# Patient Record
Sex: FEMALE | Race: WHITE | NOT HISPANIC OR LATINO | Employment: UNEMPLOYED | URBAN - METROPOLITAN AREA
[De-identification: names, ages, dates, MRNs, and addresses within clinical notes are randomized per-mention and may not be internally consistent; named-entity substitution may affect disease eponyms.]

---

## 2017-02-13 ENCOUNTER — HOSPITAL ENCOUNTER (EMERGENCY)
Facility: HOSPITAL | Age: 42
Discharge: HOME | End: 2017-02-13
Attending: EMERGENCY MEDICINE
Payer: MEDICAID

## 2017-02-13 VITALS
OXYGEN SATURATION: 97 % | SYSTOLIC BLOOD PRESSURE: 120 MMHG | DIASTOLIC BLOOD PRESSURE: 78 MMHG | HEIGHT: 63 IN | TEMPERATURE: 98 F | RESPIRATION RATE: 20 BRPM | WEIGHT: 135 LBS | HEART RATE: 80 BPM | BODY MASS INDEX: 23.92 KG/M2

## 2017-02-13 DIAGNOSIS — R07.9 CHEST PAIN: ICD-10-CM

## 2017-02-13 DIAGNOSIS — M94.0 COSTOCHONDRITIS: Primary | ICD-10-CM

## 2017-02-13 LAB
ALBUMIN SERPL BCP-MCNC: 4.4 G/DL
ALP SERPL-CCNC: 100 U/L
ALT SERPL W/O P-5'-P-CCNC: 16 U/L
ANION GAP SERPL CALC-SCNC: 9 MMOL/L
AST SERPL-CCNC: 17 U/L
BASOPHILS # BLD AUTO: 0.05 K/UL
BASOPHILS NFR BLD: 0.7 %
BILIRUB SERPL-MCNC: 0.3 MG/DL
BILIRUB UR QL STRIP: NEGATIVE
BUN SERPL-MCNC: 32 MG/DL
CALCIUM SERPL-MCNC: 10.1 MG/DL
CHLORIDE SERPL-SCNC: 103 MMOL/L
CLARITY UR: CLEAR
CO2 SERPL-SCNC: 26 MMOL/L
COLOR UR: NORMAL
CREAT SERPL-MCNC: 1 MG/DL
DIFFERENTIAL METHOD: ABNORMAL
EOSINOPHIL # BLD AUTO: 0.2 K/UL
EOSINOPHIL NFR BLD: 2.2 %
ERYTHROCYTE [DISTWIDTH] IN BLOOD BY AUTOMATED COUNT: 13.5 %
EST. GFR  (AFRICAN AMERICAN): >60 ML/MIN/1.73 M^2
EST. GFR  (NON AFRICAN AMERICAN): >60 ML/MIN/1.73 M^2
GLUCOSE SERPL-MCNC: 88 MG/DL
GLUCOSE UR QL STRIP: NEGATIVE
HCT VFR BLD AUTO: 45.3 %
HGB BLD-MCNC: 15.2 G/DL
HGB UR QL STRIP: NEGATIVE
KETONES UR QL STRIP: NEGATIVE
LEUKOCYTE ESTERASE UR QL STRIP: NEGATIVE
LIPASE SERPL-CCNC: 46 U/L
LYMPHOCYTES # BLD AUTO: 2.8 K/UL
LYMPHOCYTES NFR BLD: 37.7 %
MCH RBC QN AUTO: 30.8 PG
MCHC RBC AUTO-ENTMCNC: 33.6 %
MCV RBC AUTO: 92 FL
MONOCYTES # BLD AUTO: 0.3 K/UL
MONOCYTES NFR BLD: 4.1 %
NEUTROPHILS # BLD AUTO: 4 K/UL
NEUTROPHILS NFR BLD: 55.3 %
NITRITE UR QL STRIP: NEGATIVE
PH UR STRIP: 5 [PH] (ref 5–8)
PLATELET # BLD AUTO: 352 K/UL
PMV BLD AUTO: 11.6 FL
POTASSIUM SERPL-SCNC: 4.9 MMOL/L
PROT SERPL-MCNC: 8.4 G/DL
PROT UR QL STRIP: NEGATIVE
RBC # BLD AUTO: 4.94 M/UL
SODIUM SERPL-SCNC: 138 MMOL/L
SP GR UR STRIP: 1.02 (ref 1–1.03)
TROPONIN I SERPL DL<=0.01 NG/ML-MCNC: 0.01 NG/ML
URN SPEC COLLECT METH UR: NORMAL
UROBILINOGEN UR STRIP-ACNC: NEGATIVE EU/DL
WBC # BLD AUTO: 7.3 K/UL

## 2017-02-13 PROCEDURE — 80053 COMPREHEN METABOLIC PANEL: CPT

## 2017-02-13 PROCEDURE — 85025 COMPLETE CBC W/AUTO DIFF WBC: CPT

## 2017-02-13 PROCEDURE — 25000003 PHARM REV CODE 250: Performed by: EMERGENCY MEDICINE

## 2017-02-13 PROCEDURE — 84484 ASSAY OF TROPONIN QUANT: CPT

## 2017-02-13 PROCEDURE — 96372 THER/PROPH/DIAG INJ SC/IM: CPT

## 2017-02-13 PROCEDURE — 83690 ASSAY OF LIPASE: CPT

## 2017-02-13 PROCEDURE — 63600175 PHARM REV CODE 636 W HCPCS: Performed by: EMERGENCY MEDICINE

## 2017-02-13 PROCEDURE — 81003 URINALYSIS AUTO W/O SCOPE: CPT

## 2017-02-13 PROCEDURE — 99284 EMERGENCY DEPT VISIT MOD MDM: CPT | Mod: 25

## 2017-02-13 RX ORDER — NAPROXEN 375 MG/1
375 TABLET ORAL 2 TIMES DAILY WITH MEALS
Qty: 30 TABLET | Refills: 0 | Status: SHIPPED | OUTPATIENT
Start: 2017-02-13 | End: 2017-06-13

## 2017-02-13 RX ORDER — CYCLOBENZAPRINE HCL 10 MG
10 TABLET ORAL 3 TIMES DAILY PRN
Qty: 15 TABLET | Refills: 0 | Status: SHIPPED | OUTPATIENT
Start: 2017-02-13 | End: 2017-02-18

## 2017-02-13 RX ORDER — KETOROLAC TROMETHAMINE 30 MG/ML
10 INJECTION, SOLUTION INTRAMUSCULAR; INTRAVENOUS
Status: COMPLETED | OUTPATIENT
Start: 2017-02-13 | End: 2017-02-13

## 2017-02-13 RX ADMIN — KETOROLAC TROMETHAMINE 10 MG: 30 INJECTION, SOLUTION INTRAMUSCULAR at 10:02

## 2017-02-13 RX ADMIN — LIDOCAINE HYDROCHLORIDE: 20 SOLUTION ORAL; TOPICAL at 08:02

## 2017-02-13 NOTE — ED TRIAGE NOTES
C/o chest pain x 2 weeks. Radiating from rt side under breast to lt side chest area. C/o gas and heartburn. Had been taking tums which relieved Sx. Gas x is ineffective. C/o vomiting. Last episode was yesterday. Reports diarrhea last episode 2 days ago. denies fever or abd pain.

## 2017-02-13 NOTE — ED AVS SNAPSHOT
[facility]  [address] [place] [address]               Miriam Reyes   2017  7:44 AM   ED    Description:  Female : 1975   Department:  [facility]           Your Care was Coordinated By:     Provider Role From To    Mary Land MD Attending Provider 17 [id number] --      Reason for Visit     Chest Pain           Diagnoses this Visit        Comments    Costochondritis    -  Primary     Chest pain           ED Disposition     ED Disposition Condition Comment    Discharge             To Do List           Follow-up Information     Follow up with Reji Mtz NP. Schedule an appointment as soon as possible for a visit in 2 days.    Specialty:  Internal Medicine    Contact information:    [address] [place] [address] [phone]         These Medications        Disp Refills Start End    cyclobenzaprine (FLEXERIL) 10 MG tablet 15 tablet 0 2017    Take 1 tablet (10 mg total) by mouth 3 (three) times daily as needed for Muscle spasms. - Oral    Pharmacy: [place] [id number] Ph #: [phone]       naproxen (NAPROSYN) 375 MG tablet 30 tablet 0 2017     Take 1 tablet (375 mg total) by mouth 2 (two) times daily with meals. - Oral    Pharmacy: [place] [id number] Ph #: [phone]         [facility]     [facility] -  Assistance  Registered nurses in the [facility] provide clinical advisement, health education, appointment booking, and other advisory services.  Call for this free service at [phone].             Medications           Message regarding Medications     Verify the changes and/or additions to your medication regime listed below are the same as discussed with your clinician today.  If any of these changes or additions are incorrect, please notify your healthcare provider.         START taking these NEW medications        Refills    cyclobenzaprine (FLEXERIL) 10 MG tablet 0    Sig: Take 1 tablet (10 mg total) by mouth 3 (three) times daily as needed for Muscle spasms.    Class: Print    Route: Oral    naproxen (NAPROSYN) 375 MG tablet 0    Sig: Take 1 tablet (375 mg total) by mouth 2 (two) times daily with meals.    Class: Print    Route: Oral      These medications were administered today        Dose Freq    (pyxis) gi cocktail (mylanta 30 mL, lidocaine 2 % viscous 10 mL, dicyclomine 10 mL) 50 mL  ED 1 Time    Sig: Take by mouth ED 1 Time.    Class: Normal    Route: Oral    ketorolac injection 10 mg 10 mg ED 1 Time    Sig: Inject 10 mg into the muscle ED 1 Time.    Class: Normal    Route: Intramuscular           Verify that the below list of medications is an accurate representation of the medications you are currently taking.  If none reported, the list may be blank. If incorrect, please contact your healthcare provider. Carry this list with you in case of emergency.           Current Medications     clonazepam (KLONOPIN) 0.5 MG tablet Take 0.5 mg by mouth 2 (two) times daily.    fluoxetine (PROZAC) 20 MG capsule Take 20 mg by mouth once daily.    risperidone (RISPERDAL) 1 MG tablet Take 1 mg by mouth every evening.    trazodone (DESYREL) 50 MG tablet Take 50 mg by mouth every evening.    (Magic mouthwash) 1:1:1 Benadryl 12.5mg/5ml liq, aluminum & magnesium hydroxide-simehticone (Maalox), lidocaine viscous 2% Swish and swallow 90 mLs every 4 (four) hours as needed. for mouth sores    acetic ac-apy-benzo-policos-al (AURALGAN W/ ACETIC ACID) 5.4-1.4 % otic solution Place 4 drops in ear(s) 3 (three) times daily.    cyclobenzaprine (FLEXERIL) 10 MG tablet Take 1 tablet (10 mg total) by mouth 3 (three) times daily as needed for Muscle spasms.    estradiol (EVAMIST) 1.53 mg/spray (1.7%) transdermal spray Place 3 sprays onto the skin once daily.    hydrocodone-acetaminophen 5-325mg (NORCO)  "5-325 mg per tablet Take 1 tablet by mouth every 4 (four) hours as needed for Pain.    ketorolac injection 10 mg Inject 10 mg into the muscle ED 1 Time.    methylPREDNISolone (MEDROL DOSEPACK) 4 mg tablet use as directed    naproxen (NAPROSYN) 375 MG tablet Take 1 tablet (375 mg total) by mouth 2 (two) times daily with meals.    pantoprazole (PROTONIX) 20 MG tablet Take 2 tablets (40 mg total) by mouth once daily.           Clinical Reference Information           Your Vitals Were     BP Pulse Temp Resp Height Weight    119/82 (BP Location: Left arm) 82 97.9 °F (36.6 °C) (Oral) 18 5' 3" (1.6 m) 61.2 kg (135 lb)    SpO2 BMI             97% 23.91 kg/m2         Allergies as of 2/13/2017        Reactions    Metaxalone     Other reaction(s): Anaphylaxis    Soma [Carisoprodol]     Other reaction(s): Anaphylaxis    Penicillins     Other reaction(s): Unknown      Immunizations Administered on Date of Encounter - 2/13/2017     None      ED Micro, Lab, POCT     Start Ordered       Status Ordering Provider    02/13/17 0806 02/13/17 0806  CBC auto differential  STAT      Final result     02/13/17 0806 02/13/17 0806  Comprehensive metabolic panel  STAT      Final result     02/13/17 0806 02/13/17 0806  Lipase  STAT      Final result     02/13/17 0806 02/13/17 0806  Troponin I  STAT      Final result     02/13/17 0806 02/13/17 0806  Urinalysis  STAT      Final result       ED Imaging Orders     Start Ordered       Status Ordering Provider    02/13/17 0806 02/13/17 0806  X-Ray Chest PA And Lateral  1 time imaging      Final result         Discharge Instructions         Take medications as directed.  Follow-up your primary care doctor.  Return for any new or worsening complaints.  Chest Wall Pain: Costochondritis    The chest pain that you have had today is caused by costochondritis. This condition is caused by an inflammation of the cartilage joining your ribs to your breastbone. It is not caused by heart or lung problems. The " inflammation may have been brought on by a blow to the chest, lifting heavy objects, intense exercise, or an illness that made you cough and sneeze. It often occurs during times of emotional stress. It can be painful, but it is not dangerous. It usually goes away in 1 to 2 weeks. But it may happen again. Rarely, a more serious condition may cause symptoms similar to costochondritis. Thats why its important to watch for the warning signs listed below.  Home care  Follow these guidelines when caring for yourself at home:  · If you feel that emotional stress is a cause of your condition, try to figure out the sources of that stress. It may not be obvious! Learn ways to deal with the stress in your life. This can include regular exercise, muscle relaxation, meditation, or simply taking time out for yourself. For more information about this, talk with your health care provider. Or go to your local library and look at books on stress reduction.  · You may use acetaminophen or ibuprofen to control pain, unless another pain medicine was prescribed. If you have liver disease or ever had a stomach ulcer, talk with your health care provider before using these medicines.  · You can also help ease pain by using a hot, wet compress or heating pad. Use this with or without a medicated skin cream that helps relieves pain.  · Do stretching exercise as advised by your provider.  · Take any prescribed medicines as directed.  Follow-up care  Follow up with your health care provider, or as advised, if you do not start to get better in the next 2 days.  When to seek medical advice  Call your health care provider right away if any of these occur:  · A change in the type of pain. Call if it feels different, becomes more serious, lasts longer, or spreads into your shoulder, arm, neck, jaw, or back.  · Shortness of breath or pain gets worse when you breathe  · Weakness, dizziness, or fainting  · Cough with dark-colored sputum (phlegm) or  blood  · Abdominal pain  · Dark red or black stools  · Fever of 100.4ºF (38ºC) or higher, or as directed by your health care provider  Date Last Reviewed: 11/24/2014  © 2428-5934 [employer]. [address] [place] [address], [place] [address]. All rights reserved. This information is not intended as a substitute for professional medical care. Always follow your healthcare professional's instructions.          [facility] Sign-Up     Activating your [facility] account is as easy as 1-2-3!     1) Visit [email or web address], select Sign Up Now, enter this activation code and your date of birth, then select Next.  [email or web address]  Expires: 3/30/2017  9:47 AM      2) Create a username and password to use when you visit [facility] in the future and select a security question in case you lose your password and select Next.    3) Enter your e-mail address and click Sign Up!    Additional Information  If you have questions, please e-mail [email or web address] or call [phone] to talk to our [facility] staff. Remember, [facility] is NOT to be used for urgent needs. For medical emergencies, dial 911.         Smoking Cessation     If you would like to quit smoking:   You may be eligible for free services if you are a [place] resident and started smoking cigarettes before September 1, 1988.  Call the Smoking Cessation Trust (SCT) toll free at (019) 081-8851 or (061) 195-4710.   Call 1-800-QUIT-NOW if you do not meet the above criteria.             [facility] complies with applicable Federal civil rights laws and does not discriminate on the basis of race, color, national origin, age, disability, or sex.        Language Assistance Services     ATTENTION: Language assistance services are available, free of charge. Please call [phone].      ATENCIÓN: Si habla español, tiene a villela disposición servicios gratuitos de asistencia lingüística. Llame al [phone].     CHÚ Ý: N?u b?n nói Ti?ng Vi?t, có các  d?ch v? h? tr? wilton abez? mi?n phí dành cho b?n. G?i s? [phone].

## 2017-02-13 NOTE — DISCHARGE INSTRUCTIONS
Take medications as directed.  Follow-up your primary care doctor.  Return for any new or worsening complaints.  Chest Wall Pain: Costochondritis    The chest pain that you have had today is caused by costochondritis. This condition is caused by an inflammation of the cartilage joining your ribs to your breastbone. It is not caused by heart or lung problems. The inflammation may have been brought on by a blow to the chest, lifting heavy objects, intense exercise, or an illness that made you cough and sneeze. It often occurs during times of emotional stress. It can be painful, but it is not dangerous. It usually goes away in 1 to 2 weeks. But it may happen again. Rarely, a more serious condition may cause symptoms similar to costochondritis. Thats why its important to watch for the warning signs listed below.  Home care  Follow these guidelines when caring for yourself at home:  · If you feel that emotional stress is a cause of your condition, try to figure out the sources of that stress. It may not be obvious! Learn ways to deal with the stress in your life. This can include regular exercise, muscle relaxation, meditation, or simply taking time out for yourself. For more information about this, talk with your health care provider. Or go to your local library and look at books on stress reduction.  · You may use acetaminophen or ibuprofen to control pain, unless another pain medicine was prescribed. If you have liver disease or ever had a stomach ulcer, talk with your health care provider before using these medicines.  · You can also help ease pain by using a hot, wet compress or heating pad. Use this with or without a medicated skin cream that helps relieves pain.  · Do stretching exercise as advised by your provider.  · Take any prescribed medicines as directed.  Follow-up care  Follow up with your health care provider, or as advised, if you do not start to get better in the next 2 days.  When to seek medical  advice  Call your health care provider right away if any of these occur:  · A change in the type of pain. Call if it feels different, becomes more serious, lasts longer, or spreads into your shoulder, arm, neck, jaw, or back.  · Shortness of breath or pain gets worse when you breathe  · Weakness, dizziness, or fainting  · Cough with dark-colored sputum (phlegm) or blood  · Abdominal pain  · Dark red or black stools  · Fever of 100.4ºF (38ºC) or higher, or as directed by your health care provider  Date Last Reviewed: 11/24/2014  © 9218-9128 [employer]. [address] [place] [address], [place] [address]. All rights reserved. This information is not intended as a substitute for professional medical care. Always follow your healthcare professional's instructions.

## 2017-02-13 NOTE — ED PROVIDER NOTES
"Encounter Date: 2/13/2017    SCRIBE #1 NOTE: I, Terri Samano, am scribing for, and in the presence of,  Mary Land MD. I have scribed the following portions of the note - Other sections scribed: ROS, HPI.       History     Chief Complaint   Patient presents with    Chest Pain     Pt states she reached over into the bed of a truck and hit her rib cage on right side and now she states it radiates to her left side.  Also states she has gas.       Review of patient's allergies indicates:   Allergen Reactions    Metaxalone      Other reaction(s): Anaphylaxis    Soma [carisoprodol]      Other reaction(s): Anaphylaxis    Penicillins      Other reaction(s): Unknown     HPI Comments: CC: Chest Pain    HPI: Patient is a 41 y.o. F with a past medical history of Anxiety; Bipolar 1 disorder; Chronic kidney disease; Endometriosis of pelvis; and Ovarian cancer who presents to the ED for evaluation of acute, worsening chest pain that "started on the right side below her rib cage but has spread to the left chest and under the left underarm" x2 weeks. She reports associated nausea, vomiting (last episode this morning), and a subjective fever.  Pain is severe, worsening, and exacerbated by movement or twisting. She attempted treatment with Ibuprofen (last dose 2 days ago) with mild relief of R-sided chest pain. Patient states she "had been eating a lot of ramen noodles" and was concerned her symptoms were related to gas; she attempted treatment with Tums and GasX which relieved her gas but not her chest pain. She denies abdominal pain, cough, shortness of breath, BLE swelling, calf pain, rash, dysuria, appetite change, and constipation. Patient has no PMHx of DVT, and reports no recent travel, surgeries, or heavy lifting. PCP is Dr. Steve.  Denies family history of coronary artery disease.    The history is provided by the patient. No  was used.     Past Medical History   Diagnosis Date    Anxiety     " Bipolar 1 disorder     Chronic kidney disease      NEPHROLITHIASIS    Endometriosis of pelvis     H/O colonoscopy 1/10/14     INTERNAL HEMORRHOID    Lactose intolerance     Ovarian cancer      No past medical history pertinent negatives.  Past Surgical History   Procedure Laterality Date     section      Tubal ligation      Hysterectomy      Appendectomy      Cholecystectomy       Family History   Problem Relation Age of Onset    Mental illness Mother     Heart disease Father      defibrillator    Cancer Maternal Grandmother      ovarian cancer     Social History   Substance Use Topics    Smoking status: Former Smoker     Packs/day: 0.50     Years: 22.00     Quit date: 2012    Smokeless tobacco: None    Alcohol use No      Comment: rarely     Review of Systems   Constitutional: Positive for fever (subjective). Negative for appetite change, chills and fatigue.   HENT: Negative for congestion, drooling, hearing loss, rhinorrhea, sneezing, sore throat and voice change.    Eyes: Negative for redness and visual disturbance.   Respiratory: Negative for cough, choking, shortness of breath, wheezing and stridor.    Cardiovascular: Positive for chest pain. Negative for palpitations and leg swelling.   Gastrointestinal: Positive for nausea and vomiting. Negative for abdominal pain, constipation and rectal pain.   Endocrine: Negative for polydipsia.   Genitourinary: Negative for dysuria, flank pain, frequency and hematuria.   Musculoskeletal: Negative for arthralgias, back pain, myalgias and neck pain.   Skin: Negative for rash.   Neurological: Negative for seizures, syncope, weakness, numbness and headaches.   Hematological: Negative for adenopathy.   Psychiatric/Behavioral: Negative for agitation and confusion.       Physical Exam   Initial Vitals   BP Pulse Resp Temp SpO2   17 0733 17 0733 17 0733 17 0733 17 0733   119/82 82 18 97.9 °F (36.6 °C) 97 %     Physical  Exam    Nursing note and vitals reviewed.  Constitutional: She appears well-developed and well-nourished. She is not diaphoretic. No distress.   HENT:   Head: Normocephalic and atraumatic.   Right Ear: External ear normal.   Left Ear: External ear normal.   Nose: Nose normal.   Mouth/Throat: Oropharynx is clear and moist. No oropharyngeal exudate.   Eyes: Conjunctivae and EOM are normal. Pupils are equal, round, and reactive to light. Right eye exhibits no discharge. Left eye exhibits no discharge. No scleral icterus.   Neck: Normal range of motion. Neck supple. No thyromegaly present. No tracheal deviation present.   Cardiovascular: Normal rate, regular rhythm, normal heart sounds and intact distal pulses. Exam reveals no gallop and no friction rub.    No murmur heard.  Pulmonary/Chest: Breath sounds normal. No stridor. No respiratory distress. She has no wheezes. She has no rhonchi. She has no rales. She exhibits no tenderness.   Abdominal: Soft. Bowel sounds are normal. She exhibits no distension. There is no tenderness. There is no rebound.   Musculoskeletal: Normal range of motion. She exhibits no edema or tenderness.   Lymphadenopathy:     She has no cervical adenopathy.   Neurological: She is alert and oriented to person, place, and time. She has normal strength and normal reflexes. No cranial nerve deficit or sensory deficit.   Skin: Skin is warm and dry. No rash noted. No erythema. No pallor.   Psychiatric: She has a normal mood and affect. Her behavior is normal. Thought content normal.         ED Course   Procedures  Labs Reviewed   CBC W/ AUTO DIFFERENTIAL - Abnormal; Notable for the following:        Result Value    Platelets 352 (*)     All other components within normal limits   COMPREHENSIVE METABOLIC PANEL - Abnormal; Notable for the following:     BUN, Bld 32 (*)     All other components within normal limits   LIPASE   TROPONIN I   URINALYSIS     EKG Readings: (Independently Interpreted)   Initial  Reading: No STEMI. Rhythm: Normal Sinus Rhythm. Heart Rate: 77. Ectopy: No Ectopy. ST Segments: Normal ST Segments. T Waves: Normal. Axis: Normal.       X-Rays:   Independently Interpreted Readings:   Other Readings:  Chest x-ray was reviewed.  There is no focal consolidation or pneumothorax.  No pleural effusion.    Medical Decision Makin-year-old female with history of bipolar disorder, endometriosis, ovarian cancer presents to the emergency department complaining of atraumatic chest wall pain that began on the right side and moved to the left side of her chest for the last 2 weeks.  Patient also reports GERD-like symptoms over the same time period that improved with Tums and Avelina-Delmar.  On exam she is afebrile with stable vital signs.  She is nontoxic appearing.  She is in no respiratory distress.  Differential diagnosis includes but is not limited to costochondritis, esophageal spasm, peptic ulcer disease, GERD, viral syndrome, muscle strain.  I considered but do not suspect sepsis, aortic dissection, acute coronary syndrome, pulmonary embolism.  Labs were reviewed and are grossly unremarkable.  Patient feels better after GI cocktail and Toradol.  X-ray is unremarkable.  EKG is unremarkable.  I have counseled the patient on the importance of close follow-up with her primary care doctor.  Return precautions were given.  She agrees with this plan.            Scribe Attestation:   Scribe #1: I performed the above scribed service and the documentation accurately describes the services I performed. I attest to the accuracy of the note.    Attending Attestation:           Physician Attestation for Scribe:  Physician Attestation Statement for Scribe #1: I, Mary Land MD, reviewed documentation, as scribed by Terri Samano in my presence, and it is both accurate and complete.                 ED Course     Clinical Impression:   The primary encounter diagnosis was Costochondritis. A diagnosis of Chest  pain was also pertinent to this visit.          Mary Land MD  02/13/17 9403

## 2017-06-13 ENCOUNTER — HOSPITAL ENCOUNTER (EMERGENCY)
Facility: HOSPITAL | Age: 42
Discharge: HOME | End: 2017-06-13
Attending: EMERGENCY MEDICINE
Payer: MEDICAID

## 2017-06-13 VITALS
DIASTOLIC BLOOD PRESSURE: 84 MMHG | SYSTOLIC BLOOD PRESSURE: 124 MMHG | WEIGHT: 152 LBS | HEART RATE: 78 BPM | BODY MASS INDEX: 26.93 KG/M2 | HEIGHT: 63 IN | OXYGEN SATURATION: 99 % | RESPIRATION RATE: 18 BRPM | TEMPERATURE: 99 F

## 2017-06-13 DIAGNOSIS — M25.561 KNEE PAIN, RIGHT: ICD-10-CM

## 2017-06-13 DIAGNOSIS — M25.579 ANKLE PAIN: ICD-10-CM

## 2017-06-13 DIAGNOSIS — M25.561 RIGHT KNEE PAIN: ICD-10-CM

## 2017-06-13 DIAGNOSIS — S86.911A KNEE STRAIN, RIGHT, INITIAL ENCOUNTER: Primary | ICD-10-CM

## 2017-06-13 LAB
ALBUMIN SERPL BCP-MCNC: 4 G/DL
ALP SERPL-CCNC: 74 U/L
ALT SERPL W/O P-5'-P-CCNC: 15 U/L
ANION GAP SERPL CALC-SCNC: 8 MMOL/L
ANION GAP SERPL CALC-SCNC: 8 MMOL/L
AST SERPL-CCNC: 18 U/L
BASOPHILS # BLD AUTO: 0.03 K/UL
BASOPHILS NFR BLD: 0.5 %
BILIRUB SERPL-MCNC: 0.3 MG/DL
BUN SERPL-MCNC: 19 MG/DL
BUN SERPL-MCNC: 19 MG/DL
CALCIUM SERPL-MCNC: 9.8 MG/DL
CALCIUM SERPL-MCNC: 9.8 MG/DL
CHLORIDE SERPL-SCNC: 101 MMOL/L
CHLORIDE SERPL-SCNC: 101 MMOL/L
CO2 SERPL-SCNC: 26 MMOL/L
CO2 SERPL-SCNC: 26 MMOL/L
CREAT SERPL-MCNC: 0.9 MG/DL
CREAT SERPL-MCNC: 0.9 MG/DL
CRP SERPL-MCNC: 1.2 MG/L
DIFFERENTIAL METHOD: NORMAL
EOSINOPHIL # BLD AUTO: 0.1 K/UL
EOSINOPHIL NFR BLD: 1.2 %
ERYTHROCYTE [DISTWIDTH] IN BLOOD BY AUTOMATED COUNT: 13.3 %
ERYTHROCYTE [SEDIMENTATION RATE] IN BLOOD BY WESTERGREN METHOD: 5 MM/HR
EST. GFR  (AFRICAN AMERICAN): >60 ML/MIN/1.73 M^2
EST. GFR  (AFRICAN AMERICAN): >60 ML/MIN/1.73 M^2
EST. GFR  (NON AFRICAN AMERICAN): >60 ML/MIN/1.73 M^2
EST. GFR  (NON AFRICAN AMERICAN): >60 ML/MIN/1.73 M^2
GLUCOSE SERPL-MCNC: 95 MG/DL
GLUCOSE SERPL-MCNC: 95 MG/DL
HCT VFR BLD AUTO: 44.5 %
HGB BLD-MCNC: 15.1 G/DL
LYMPHOCYTES # BLD AUTO: 1.7 K/UL
LYMPHOCYTES NFR BLD: 25.1 %
MCH RBC QN AUTO: 30.4 PG
MCHC RBC AUTO-ENTMCNC: 33.9 %
MCV RBC AUTO: 90 FL
MONOCYTES # BLD AUTO: 0.5 K/UL
MONOCYTES NFR BLD: 6.8 %
NEUTROPHILS # BLD AUTO: 4.4 K/UL
NEUTROPHILS NFR BLD: 66.4 %
PLATELET # BLD AUTO: 283 K/UL
PMV BLD AUTO: 11.4 FL
POTASSIUM SERPL-SCNC: 4.5 MMOL/L
POTASSIUM SERPL-SCNC: 4.5 MMOL/L
PROT SERPL-MCNC: 7.8 G/DL
RBC # BLD AUTO: 4.97 M/UL
SODIUM SERPL-SCNC: 135 MMOL/L
SODIUM SERPL-SCNC: 135 MMOL/L
URATE SERPL-MCNC: 3.4 MG/DL
WBC # BLD AUTO: 6.66 K/UL

## 2017-06-13 PROCEDURE — 96374 THER/PROPH/DIAG INJ IV PUSH: CPT

## 2017-06-13 PROCEDURE — 99284 EMERGENCY DEPT VISIT MOD MDM: CPT | Mod: 25

## 2017-06-13 PROCEDURE — 63600175 PHARM REV CODE 636 W HCPCS: Performed by: NURSE PRACTITIONER

## 2017-06-13 PROCEDURE — 85025 COMPLETE CBC W/AUTO DIFF WBC: CPT

## 2017-06-13 PROCEDURE — 84550 ASSAY OF BLOOD/URIC ACID: CPT

## 2017-06-13 PROCEDURE — 86140 C-REACTIVE PROTEIN: CPT

## 2017-06-13 PROCEDURE — 85651 RBC SED RATE NONAUTOMATED: CPT

## 2017-06-13 PROCEDURE — 80053 COMPREHEN METABOLIC PANEL: CPT

## 2017-06-13 PROCEDURE — 29505 APPLICATION LONG LEG SPLINT: CPT | Mod: RT

## 2017-06-13 RX ORDER — NAPROXEN 500 MG/1
500 TABLET ORAL 2 TIMES DAILY WITH MEALS
Qty: 60 TABLET | Refills: 0 | Status: SHIPPED | OUTPATIENT
Start: 2017-06-13 | End: 2019-01-04

## 2017-06-13 RX ORDER — IBUPROFEN 200 MG
200 TABLET ORAL EVERY 6 HOURS PRN
COMMUNITY
End: 2019-01-04

## 2017-06-13 RX ORDER — KETOROLAC TROMETHAMINE 30 MG/ML
10 INJECTION, SOLUTION INTRAMUSCULAR; INTRAVENOUS
Status: COMPLETED | OUTPATIENT
Start: 2017-06-13 | End: 2017-06-13

## 2017-06-13 RX ORDER — ACETAMINOPHEN 325 MG/1
325 TABLET ORAL EVERY 6 HOURS PRN
Status: ON HOLD | COMMUNITY
End: 2023-03-17 | Stop reason: HOSPADM

## 2017-06-13 RX ADMIN — KETOROLAC TROMETHAMINE 10 MG: 30 INJECTION, SOLUTION INTRAMUSCULAR at 03:06

## 2017-06-13 NOTE — DISCHARGE INSTRUCTIONS
Try to get plenty of rest and stay well-hydrated on these medications.  You can wear the knee immobilizer to help rest the joint.  Use crutches when wearing the knee immoblizer.    RICE - Rest, Ice, Compress, Elevate (see handout).    Please take medications as prescribed.    Take Naproxen for pain and inflammation.  You can take Naproxen every 12 hours, with food.    Follow up with orthopedics in 1 week if symptoms have not improved. If you would like to follow-up with the [facility] orthopedic clinic for further care of your injury, please ensure that you have a CD with your x-rays or other images taken at your visit today.  Please bring the CD and discharge papers [facility], [address] [place] [address] [place] [address], first floor Registration Desk in the Ambulatory Care building.  Upon receipt of your information and CD, you will be scheduled for your follow-up in the orthopedic clinic.      Return to ER for any new or worsening symptoms.

## 2017-06-13 NOTE — ED PROVIDER NOTES
"Encounter Date: 2017    SCRIBE #1 NOTE: I, Iglesia Keyanna, am scribing for, and in the presence of, Afsaneh Lynch NP. Other sections scribed: HPI, ROS.       History     Chief Complaint   Patient presents with    Knee Pain     R knee pain and swelling since yesterday. Denies known injury. Thinks maybe she was bitten by something     Review of patient's allergies indicates:   Allergen Reactions    Metaxalone      Other reaction(s): Anaphylaxis    Soma [carisoprodol]      Other reaction(s): Anaphylaxis    Penicillins      Other reaction(s): Unknown     CC: Knee Pain  HPI: This 41 y.o. female with Bipolar 1 disorder, anxiety and Hx of hysterectomy, ovarian cancer presents to the ED c/o atraumatic moderate (6/10) R knee pain that developed 2 nights ago when she stood up to do laundry at her home. Pt states that she had a strange feeling in her knee "like it was going to pop"; she states that it then began to hurt when she walked on it. She states that when she woke up yesterday morning she had increased pain along with swelling to the knee. She elevated her leg throughout the day, and states that this improved the swelling. Pt states that she woke up this morning at 0200 drenched in sweat and running a fever of 103.1, for which she took an Ibuprofen. Pt then reports taking Tylenol at 0800, and has taken nothing since then. Pt is worried she is developing an infection in her knee. She denies any numbness, weakness, body aches, wounds or sores. She is up to date on her tetanus immunization.           Past Medical History:   Diagnosis Date    Anxiety     Bipolar 1 disorder     Chronic kidney disease     NEPHROLITHIASIS    Endometriosis of pelvis     H/O colonoscopy 1/10/14    INTERNAL HEMORRHOID    Lactose intolerance     Ovarian cancer      Past Surgical History:   Procedure Laterality Date    APPENDECTOMY       SECTION      CHOLECYSTECTOMY      HYSTERECTOMY      TUBAL LIGATION       Family History "   Problem Relation Age of Onset    Mental illness Mother     Heart disease Father      defibrillator    Cancer Maternal Grandmother      ovarian cancer     Social History   Substance Use Topics    Smoking status: Former Smoker     Packs/day: 0.50     Years: 22.00     Quit date: 12/25/2012    Smokeless tobacco: Not on file    Alcohol use No      Comment: rarely     Review of Systems   Constitutional: Positive for diaphoresis and fever. Negative for chills.   HENT: Negative for rhinorrhea and sore throat.    Eyes: Negative for pain and visual disturbance.   Respiratory: Negative for cough and shortness of breath.    Cardiovascular: Negative for chest pain.   Gastrointestinal: Negative for abdominal pain, nausea and vomiting.   Genitourinary: Negative for difficulty urinating and dysuria.   Musculoskeletal: Positive for arthralgias (R knee) and joint swelling (R knee).   Skin: Negative for rash and wound.   Neurological: Negative for dizziness and headaches.       Physical Exam     Initial Vitals [06/13/17 1238]   BP Pulse Resp Temp SpO2   136/78 89 18 98.4 °F (36.9 °C) 99 %     Physical Exam    Nursing note and vitals reviewed.  Constitutional: Vital signs are normal. She appears well-developed and well-nourished. She is not diaphoretic. She is active and cooperative.  Non-toxic appearance. She does not appear ill. No distress.   Musculoskeletal:        Right knee: She exhibits swelling (slight), effusion and ecchymosis (slight ecchymosis noted over the patellar region). She exhibits normal range of motion, no deformity, no laceration, no erythema, no LCL laxity, normal patellar mobility, no bony tenderness and no MCL laxity. Tenderness found. MCL, LCL and patellar tendon tenderness noted.        Right ankle: Normal.   No varus/valgus laxity or deformity. Negative anterior drawer's test. Pain with full flexion/extension. No calf swelling or tenderness.   Neurological: She is alert and oriented to person, place,  and time. She has normal strength. No sensory deficit. Coordination and gait normal.   Skin: Skin is warm and dry. Capillary refill takes less than 2 seconds. No abscess noted. No erythema.   Psychiatric: She has a normal mood and affect.         ED Course   Procedures  Labs Reviewed   COMPREHENSIVE METABOLIC PANEL - Abnormal; Notable for the following:        Result Value    Sodium 135 (*)     All other components within normal limits   BASIC METABOLIC PANEL - Abnormal; Notable for the following:     Sodium 135 (*)     All other components within normal limits   C-REACTIVE PROTEIN   SEDIMENTATION RATE, MANUAL   URIC ACID   CBC W/ AUTO DIFFERENTIAL          X-Rays:   Independently Interpreted Readings:   Other Readings:  XR right knee with right suprapatellar effusion. No fracture or dislocation.       Additional MDM:   Comments: This is an urgent evaluation of a 40 y/o female that presents to the ER c/o right knee pain for 2 days.  She reports associated swelling, which has improved since onset. She also reports a fever this AM of 103F.  Exam significant for slight swelling diffusely over the right knee with no erythema, warmth, induration.  There is no palpable popliteal masses, calf erythema/tenderness. Her ROM is intact, with pain with full flexion and extension.  The joint does not appear unstable.  There is also faint ecchymosis noted over the joint.  DDx included: joint strain/sprain, fracture, subluxation, DVT, baker's cyst, gout, reactive arthritis, and to a lesser degree- infectious process.  She denies any other symptoms or complaints.  Her labs were significant for a normal WBC count without a shift.  Her CRP, sed rate, and uric acid were all WNL. Venous ultrasound of RLE was negative for acute findings.  She did have a moderate suprapatellar joint effusion noted on the knee XR.  I believe this is most likely a joint sprain or strain. I highly doubt infectious process/septic joint, reactive arthritis.   The joint does not appear unstable.  Will give knee immobilizer and crutches. To follow up with ortho referral in 1 week if no improvement. Encouraged RICE and will Rx Naproxen.  Return precautions.  Case discussed with attending, , and he is in agreement with plan. Stable for discharge and outpatient follow.  .          Scribe Attestation:   Scribe #1: I performed the above scribed service and the documentation accurately describes the services I performed. I attest to the accuracy of the note.    Attending Attestation:     Physician Attestation Statement for NP/PA:   I have conducted a face to face encounter with this patient in addition to the NP/PA, due to Medical Complexity    Other NP/PA Attestation Additions:      Medical Decision Making: Mild suprapatellar joint effusion with right knee pain.  X-ray shows no abnormalities other than joint effusion.  Patient reports fever but is afebrile at the emergency room stay.  Patient does not have an elevated white cell count or elevated ESR CRP to suggest underlying infection.  Clinical exam at this time is not consistent with a septic arthritis.  There is no erythema or warmth or pain with passive range of motion of the knee.  Patient denies any other sources of fever such as headache, URI symptoms, abdominal pain, nausea vomiting diarrhea, rash, dysuria.  No other abnormalities on clinical exam.  Ultrasound leg shows no abnormalities.  Recommend immobilizer and crutches, nonsteroidal anti-inflammatories patient tolerates, pain medicine as needed, rest, ice, elevation, return to emergency room for new or worsening symptoms specifically redness or increased swelling to the knee despite conservative management.  If not improving over the next 24-48 hrs. recommend recheck with orthopedics.         Physician Attestation for Scribe:  Physician Attestation Statement for Scribe #1: I, Afsaneh Lynch, NP, reviewed documentation, as scribed by Iglesia Briceño in my  presence, and it is both accurate and complete.                 ED Course     Clinical Impression:   The primary encounter diagnosis was Knee strain, right, initial encounter. Diagnoses of Knee pain, right, Ankle pain, and Right knee pain were also pertinent to this visit.    Disposition:   Disposition: Discharged  Condition: Stable       Afsaneh Lynch NP  06/13/17 2221       Carlos Pritchard MD  07/13/17 9573

## 2017-08-02 ENCOUNTER — HOSPITAL ENCOUNTER (EMERGENCY)
Facility: HOSPITAL | Age: 42
End: 2017-08-02
Attending: EMERGENCY MEDICINE
Payer: MEDICAID

## 2017-08-02 VITALS
HEIGHT: 64 IN | RESPIRATION RATE: 22 BRPM | HEART RATE: 98 BPM | SYSTOLIC BLOOD PRESSURE: 120 MMHG | TEMPERATURE: 99 F | BODY MASS INDEX: 27.83 KG/M2 | WEIGHT: 163 LBS | DIASTOLIC BLOOD PRESSURE: 80 MMHG | OXYGEN SATURATION: 97 %

## 2017-08-02 DIAGNOSIS — F19.929 INTOXICATION BY DRUG, WITH UNSPECIFIED COMPLICATION: ICD-10-CM

## 2017-08-02 DIAGNOSIS — R41.82 ALTERED MENTAL STATUS, UNSPECIFIED ALTERED MENTAL STATUS TYPE: Primary | ICD-10-CM

## 2017-08-02 PROBLEM — F19.980 SUBSTANCE-INDUCED ANXIETY DISORDER: Status: ACTIVE | Noted: 2017-08-02

## 2017-08-02 PROBLEM — F19.94 SUBSTANCE INDUCED MOOD DISORDER: Status: ACTIVE | Noted: 2017-08-02

## 2017-08-02 PROBLEM — F15.929 AMPHETAMINE INTOXICATION: Status: ACTIVE | Noted: 2017-08-02

## 2017-08-02 LAB
ALBUMIN SERPL BCP-MCNC: 4.2 G/DL
ALP SERPL-CCNC: 88 U/L
ALT SERPL W/O P-5'-P-CCNC: 33 U/L
AMPHET+METHAMPHET UR QL: NORMAL
ANION GAP SERPL CALC-SCNC: 7 MMOL/L
APAP SERPL-MCNC: <3 UG/ML
AST SERPL-CCNC: 29 U/L
BARBITURATES UR QL SCN>200 NG/ML: NEGATIVE
BASOPHILS # BLD AUTO: 0.03 K/UL
BASOPHILS NFR BLD: 0.4 %
BENZODIAZ UR QL SCN>200 NG/ML: NORMAL
BILIRUB SERPL-MCNC: 0.7 MG/DL
BILIRUB UR QL STRIP: NEGATIVE
BUN SERPL-MCNC: 16 MG/DL
BZE UR QL SCN: NEGATIVE
CALCIUM SERPL-MCNC: 9.1 MG/DL
CANNABINOIDS UR QL SCN: NEGATIVE
CHLORIDE SERPL-SCNC: 105 MMOL/L
CLARITY UR: CLEAR
CO2 SERPL-SCNC: 29 MMOL/L
COLOR UR: YELLOW
CREAT SERPL-MCNC: 1.2 MG/DL
CREAT UR-MCNC: 219 MG/DL
DIFFERENTIAL METHOD: NORMAL
EOSINOPHIL # BLD AUTO: 0 K/UL
EOSINOPHIL NFR BLD: 0.3 %
ERYTHROCYTE [DISTWIDTH] IN BLOOD BY AUTOMATED COUNT: 13.5 %
EST. GFR  (AFRICAN AMERICAN): >60 ML/MIN/1.73 M^2
EST. GFR  (NON AFRICAN AMERICAN): 57 ML/MIN/1.73 M^2
ETHANOL SERPL-MCNC: <10 MG/DL
GLUCOSE SERPL-MCNC: 134 MG/DL
GLUCOSE UR QL STRIP: NEGATIVE
HCT VFR BLD AUTO: 38.6 %
HGB BLD-MCNC: 12.5 G/DL
HGB UR QL STRIP: NEGATIVE
KETONES UR QL STRIP: ABNORMAL
LEUKOCYTE ESTERASE UR QL STRIP: NEGATIVE
LYMPHOCYTES # BLD AUTO: 1.4 K/UL
LYMPHOCYTES NFR BLD: 21.1 %
MCH RBC QN AUTO: 29.7 PG
MCHC RBC AUTO-ENTMCNC: 32.4 G/DL
MCV RBC AUTO: 92 FL
METHADONE UR QL SCN>300 NG/ML: NEGATIVE
MONOCYTES # BLD AUTO: 0.6 K/UL
MONOCYTES NFR BLD: 9.3 %
NEUTROPHILS # BLD AUTO: 4.7 K/UL
NEUTROPHILS NFR BLD: 68.8 %
NITRITE UR QL STRIP: NEGATIVE
OPIATES UR QL SCN: NEGATIVE
PCP UR QL SCN>25 NG/ML: NEGATIVE
PH UR STRIP: 5 [PH] (ref 5–8)
PLATELET # BLD AUTO: 252 K/UL
PMV BLD AUTO: 11.6 FL
POTASSIUM SERPL-SCNC: 3.5 MMOL/L
PROT SERPL-MCNC: 7.1 G/DL
PROT UR QL STRIP: NEGATIVE
RBC # BLD AUTO: 4.21 M/UL
SODIUM SERPL-SCNC: 141 MMOL/L
SP GR UR STRIP: 1.03 (ref 1–1.03)
T4 FREE SERPL-MCNC: 1.26 NG/DL
TOXICOLOGY INFORMATION: NORMAL
TSH SERPL DL<=0.005 MIU/L-ACNC: 0.29 UIU/ML
URN SPEC COLLECT METH UR: ABNORMAL
UROBILINOGEN UR STRIP-ACNC: NEGATIVE EU/DL
WBC # BLD AUTO: 6.77 K/UL

## 2017-08-02 PROCEDURE — 80053 COMPREHEN METABOLIC PANEL: CPT

## 2017-08-02 PROCEDURE — 99284 EMERGENCY DEPT VISIT MOD MDM: CPT

## 2017-08-02 PROCEDURE — 84443 ASSAY THYROID STIM HORMONE: CPT

## 2017-08-02 PROCEDURE — 84439 ASSAY OF FREE THYROXINE: CPT

## 2017-08-02 PROCEDURE — 81003 URINALYSIS AUTO W/O SCOPE: CPT

## 2017-08-02 PROCEDURE — 80307 DRUG TEST PRSMV CHEM ANLYZR: CPT

## 2017-08-02 PROCEDURE — 80320 DRUG SCREEN QUANTALCOHOLS: CPT

## 2017-08-02 PROCEDURE — 85025 COMPLETE CBC W/AUTO DIFF WBC: CPT

## 2017-08-02 PROCEDURE — 90792 PSYCH DIAG EVAL W/MED SRVCS: CPT | Mod: AF,HB,S$PBB, | Performed by: PSYCHIATRY & NEUROLOGY

## 2017-08-02 PROCEDURE — 80329 ANALGESICS NON-OPIOID 1 OR 2: CPT

## 2017-08-02 NOTE — ASSESSMENT & PLAN NOTE
Long history of substance use and comorbid mood disorder.  Should not be treated with risperidone, trazodone, or clonazepam.  She would have to be assessed at a baseline for appropriate management.  This is not a current problem and does not need acute inpatient psychiatric hospitalization at this time.

## 2017-08-02 NOTE — ASSESSMENT & PLAN NOTE
Long history of substance use and comorbid anxiety disorder.  Should not be treated with risperidone, trazodone, or clonazepam.  She would have to be assessed at a baseline for appropriate management.  This is not a current problem and does not need acute inpatient psychiatric hospitalization at this time.

## 2017-08-02 NOTE — CONSULTS
"[facility]  Psychiatry  Consult Note    Patient Name: Miriam Reyes  MRN: [id number]   Code Status: No Order  Admission Date: 8/2/2017  Hospital Length of Stay: 0 days  Attending Physician: Robert Hanson MD  Primary Care Provider: Andrés Steve MD    Current Legal Status: N/A    Patient information was obtained from patient, law enforcement and ER records.   Consults  Subjective:     Principal Problem:<principal problem not specified>    Chief Complaint:  Odd behavior     HPI: Patient Miriam Reyes presents to the hospital with EMS after found walking around a gas station strangely.  She is noted to be in the hospital bed very disorganized, picking, and restless.  She is a pressured and tangential historian, thus making a difficult interview.  She states that she has a history of ADD and goes to a psychiatrist in [facility] (Dr. Steve) and is prescribed clonazepam, risperidone, and trazodone.  She denies any current depression or anxiety.  Denies any manic or psychotic history.  Admits that she initially went to psychiatrist for "depression and anxiety and my  thought I was bipolar".  Patient is fidgety in bed and can't sit still.  Denies any harm to herself or others.  Says that she was picked up while walking home from the corner gas station.    Of note, before completing my interview with patient, Reymundo SAGE shows up and says that when she is medically cleared, she is going to be arrested for warrants and drug charges.    Hospital Course: Being medically cleared in ED.  Toxicology screen positive for benzos and amphetamines.         Patient History           Medical as of 8/2/2017     Past Medical History Date Comments Source    Chronic kidney disease -- NEPHROLITHIASIS Provider    Endometriosis of pelvis -- -- Provider    H/O colonoscopy 1/10/14 INTERNAL HEMORRHOID Provider    History of psychiatric hospitalization -- -- Provider    Hx of psychiatric care -- -- Provider    " Lactose intolerance -- -- Provider    Ovarian cancer -- -- Provider    Psychiatric problem -- -- Provider    Therapy -- -- Provider    Pertinent Negatives Date Noted Comments Source    Suicide attempt 2017 -- Provider            Surgical as of 2017     Past Surgical History Laterality Date Comments Source     SECTION -- -- -- Provider    TUBAL LIGATION -- -- -- Provider    HYSTERECTOMY -- -- -- Provider    APPENDECTOMY -- -- -- Provider    CHOLECYSTECTOMY -- -- -- Provider            Family as of 2017     Problem Relation Name Age of Onset Comments Source    Mental illness Mother -- -- -- Provider    Bipolar disorder Mother -- -- -- Provider    Depression Mother -- -- -- Provider    Heart disease Father -- -- defibrillator Provider    Cancer Maternal Grandmother -- -- ovarian cancer Provider    Bipolar disorder Paternal Grandmother -- -- -- Provider            Tobacco Use as of 2017     Smoking Status Smoking Start Date Smoking Quit Date Packs/day Years Used    Current Every Day Smoker -- -- 0.50 22.00    Types Comments Smokeless Tobacco Status Smokeless Tobacco Quit Date Source    -- -- Current User -- Provider            Alcohol Use as of 2017     Alcohol Use Drinks/Week Alcohol/Week Comments Source    No -- -- rarely Provider            Drug Use as of 2017     Drug Use Types Frequency Comments Source    Yes -- -- uses all types of drug and prescription medications Provider            Sexual Activity as of 2017     Sexually Active Birth Control Partners Comments Source    Yes None Male -- Provider            Activities of Daily Living as of 2017     Activities of Daily Living Question Response Comments Source    Patient feels they ought to cut down on drinking/drug use Not Asked -- Provider    Patient annoyed by others criticizing their drinking/drug use Not Asked -- Provider    Patient has felt bad or guilty about drinking/drug use Not Asked -- Provider    Patient has had a  drink/used drugs as an eye opener in the AM Not Asked -- Provider            Social Documentation as of 8/2/2017    **None**           Occupational as of 8/2/2017     Occupation Employer Comments Source    none -- -- Provider            Socioeconomic as of 8/2/2017     Marital Status Spouse Name Number of Children Years Education Preferred Language Ethnicity Race Source     -- 3 -- English /White White Provider         Additional Pertinent History Q A Comments    as of 8/2/2017 Place in Birth Order      Number of Siblings      Raised by      Childhood Trauma      Financial Status unemployed     Highest Level of Education      Lives with      Lives in home     Criminal History of      Legal Involvement      Does patient have access to a firearm?       Service      Primary Leisure Activity      Spirituality      Caffeine Use         Review of patient's allergies indicates:   Allergen Reactions    Metaxalone      Other reaction(s): Anaphylaxis    Soma [carisoprodol]      Other reaction(s): Anaphylaxis    Penicillins      Other reaction(s): Unknown       No current facility-administered medications on file prior to encounter.      Current Outpatient Prescriptions on File Prior to Encounter   Medication Sig    acetaminophen (TYLENOL) 325 MG tablet Take 325 mg by mouth every 6 (six) hours as needed for Pain.    clonazepam (KLONOPIN) 0.5 MG tablet Take 0.5 mg by mouth 2 (two) times daily.    estradiol (EVAMIST) 1.53 mg/spray (1.7%) transdermal spray Place 3 sprays onto the skin once daily.    ibuprofen (ADVIL,MOTRIN) 200 MG tablet Take 200 mg by mouth every 6 (six) hours as needed for Pain.    naproxen (NAPROSYN) 500 MG tablet Take 1 tablet (500 mg total) by mouth 2 (two) times daily with meals.    pantoprazole (PROTONIX) 20 MG tablet Take 2 tablets (40 mg total) by mouth once daily.     Psychotherapeutics     None        Review of Systems   Unable to perform ROS: Psychiatric disorder  "    Objective:     Vital Signs (Most Recent):  Temp: 98.5 °F (36.9 °C) (08/02/17 1229)  Pulse: (!) 112 (08/02/17 1229)  Resp: (!) 21 (08/02/17 1229)  BP: 123/81 (08/02/17 1229)  SpO2: 96 % (08/02/17 1229) Vital Signs (24h Range):  Temp:  [98.5 °F (36.9 °C)] 98.5 °F (36.9 °C)  Pulse:  [112] 112  Resp:  [21] 21  SpO2:  [96 %] 96 %  BP: (123)/(81) 123/81     Height: 5' 4" (162.6 cm)  Weight: 73.9 kg (163 lb)  Body mass index is 27.98 kg/m².    No intake or output data in the 24 hours ending 08/02/17 1516    Physical Exam   Psychiatric:   EXAMINATION    CONSTITUTIONAL  General Appearance: disheveled; unkept    MUSCULOSKELETAL  Muscle Strength and Tone: normal  Abnormal Involuntary Movements: fidgety, flailing arms, restless, abnormal grimacing and facial movements  Gait and Station: flailing walk    PSYCHIATRIC MENTAL STATUS EXAM   Level of Consciousness: awake and alert  Orientation: fully oriented  Grooming: appears to have hygiene but not fully kept  Psychomotor Behavior: restless, fidgety  Speech: pressured  Language: no abnormalities  Mood: "good"  Affect: odd, anxious, restless  Thought Process: tangential  Associations: linear with tangentiality   Thought Content: denies suicidal/homicidal/psychosis  Memory: intact to recent and remote  Attention: easily distracted  Fund of Knowledge: intact to conversation  Insight: poor  Judgment: poor              Significant Labs:   Last 24 Hours:   Recent Lab Results       08/02/17  1345 08/02/17  1337      Benzodiazepines Presumptive Positive      Methadone metabolites Negative      Phencyclidine Negative      Acetaminophen (Tylenol), Serum  <3.0  Comment:  Toxic Levels:  Adults (4 hr post-ingestion).........>150 ug/mL  Adults (12 hr post-ingestion)........>40 ug/mL  Peds (2 hr post-ingestion, liquid)...>225 ug/mL  (L)     Albumin  4.2     Alcohol, Medical, Serum  <10     Alkaline Phosphatase  88     ALT  33     Amphetamine Screen, Ur Presumptive Positive      Anion Gap  " 7(L)     Appearance, UA Clear      AST  29     Barbiturate Screen, Ur Negative      Baso #  0.03     Basophil%  0.4     Bilirubin (UA) Negative      Total Bilirubin  0.7  Comment:  For infants and newborns, interpretation of results should be based  on gestational age, weight and in agreement with clinical  observations.  Premature Infant recommended reference ranges:  Up to 24 hours.............<8.0 mg/dL  Up to 48 hours............<12.0 mg/dL  3-5 days..................<15.0 mg/dL  6-29 days.................<15.0 mg/dL       BUN, Bld  16     Calcium  9.1     Chloride  105     CO2  29     Cocaine (Metab.) Negative      Color, UA Yellow      Creatinine  1.2     Creatinine, Random Ur 219.0  Comment:  The random urine reference ranges provided were established   for 24 hour urine collections.  No reference ranges exist for  random urine specimens.  Correlate clinically.        Differential Method  Automated     eGFR if   >60     eGFR if non   57  Comment:  Calculation used to obtain the estimated glomerular filtration  rate (eGFR) is the CKD-EPI equation. Since race is unknown   in our information system, the eGFR values for   -American and Non--American patients are given   for each creatinine result.  (A)     Eos #  0.0     Eosinophil%  0.3     Free T4  1.26     Glucose  134(H)     Glucose, UA Negative      Gran #  4.7     Gran%  68.8     Hematocrit  38.6     Hemoglobin  12.5     Ketones, UA Trace(A)      Leukocytes, UA Negative      Lymph #  1.4     Lymph%  21.1     MCH  29.7     MCHC  32.4     MCV  92     Mono #  0.6     Mono%  9.3     MPV  11.6     Nitrite, UA Negative      Occult Blood UA Negative      Opiate Scrn, Ur Negative      pH, UA 5.0      Platelets  252     Potassium  3.5     Total Protein  7.1     Protein, UA Negative  Comment:  Recommend a 24 hour urine protein or a urine   protein/creatinine ratio if globulin induced proteinuria is  clinically  suspected.        RBC  4.21     RDW  13.5     Sodium  141     Specific Gravity, UA 1.030      Specimen UA Urine, Clean Catch      Marijuana (THC) Metabolite Negative      Toxicology Information SEE COMMENT  Comment:  This screen includes the following classes of drugs at the   listed cut-off:  Benzodiazepines                  200 ng/ml  Methadone                        300 ng/ml  Cocaine metabolite               300 ng/ml  Opiates                          300 ng/ml  Barbiturates                     200 ng/ml  Amphetamines                    1000 ng/ml  Marijuana metabs (THC)            50 ng/ml  Phencyclidine (PCP)               25 ng/ml  High concentrations of Diphenhydramine may cross-react with  Phencyclidine PCP screening immunoassay giving a false   positive result.  High concentrations of Methylenedioxymethamphetamine (MDMA aka  Ectasy) and other structurally similar compounds may cross-   react with the Amphetamine/Methamphetamine screening   immunoassay giving a false positive result.  A metabolite of the anti-HIV drug Sustiva () may cause  false positive results in the Marijuana metabolite (THC)   screening assay.  Note: This exception list includes only more common   interferants in toxicology screen testing.  Because of many   cross-reactantspositive results on toxicology drug screens   should be confirmed whenever results do not correlate with   clinical presentation.  This report is intended for use in clinical monitoring and  management of patients. It is not intended for use in   employment related drug testing.  Because of any cross-reactants, positive results on toxicology  drug screens should be confirmed whenever results do not  correlate with clinical presentation.  Presumptive positive results are unconfirmed and may be used   only for medical purposes.        TSH  0.292(L)     Urobilinogen, UA Negative      WBC  6.77         All pertinent labs within the past 24 hours have been  reviewed.    Significant Imaging: I have reviewed all pertinent imaging results/findings within the past 24 hours.    Assessment/Plan:     Substance-induced anxiety disorder    Long history of substance use and comorbid anxiety disorder.  Should not be treated with risperidone, trazodone, or clonazepam.  She would have to be assessed at a baseline for appropriate management.  This is not a current problem and does not need acute inpatient psychiatric hospitalization at this time.        Substance induced mood disorder    Long history of substance use and comorbid mood disorder.  Should not be treated with risperidone, trazodone, or clonazepam.  She would have to be assessed at a baseline for appropriate management.  This is not a current problem and does not need acute inpatient psychiatric hospitalization at this time.        Amphetamine intoxication    Currently intoxicated with amphetamines as per tox screen.  This should wear off shortly.  No psychiatric intervention needed.  She should follow up with rehab as an inpatient when medically cleared.  This is a voluntary process which she should seek out on her own.             Salbador Redd MD   Psychiatry  [facility]

## 2017-08-02 NOTE — HPI
"Patient Miriam Reyes presents to the hospital with EMS after found walking around a gas station strangely.  She is noted to be in the hospital bed very disorganized, picking, and restless.  She is a pressured and tangential historian, thus making a difficult interview.  She states that she has a history of ADD and goes to a psychiatrist in [facility] (Dr. Steve) and is prescribed clonazepam, risperidone, and trazodone.  She denies any current depression or anxiety.  Denies any manic or psychotic history.  Admits that she initially went to psychiatrist for "depression and anxiety and my  thought I was bipolar".  Patient is fidgety in bed and can't sit still.  Denies any harm to herself or others.  Says that she was picked up while walking [place] the [place].    Of note, before completing my interview with patient, Reymundo SAGE shows up and says that when she is medically cleared, she is going to be arrested for warrants and drug charges.  "

## 2017-08-02 NOTE — ED TRIAGE NOTES
"Arrive via personal transportation. ETOH abuse. EMS reports pt was found at gas station. States pt has a history of haldol abuse. Was given 0.5mg of narcan and 25 mg of benadryl with EMS. Restless. Pt states 'I always move around like this". Pt denies alcohol and drug abuse.  AAox3.   "

## 2017-08-02 NOTE — ASSESSMENT & PLAN NOTE
Currently intoxicated with amphetamines as per tox screen.  This should wear off shortly.  No psychiatric intervention needed.  She should follow up with rehab as an inpatient when medically cleared.  This is a voluntary process which she should seek out on her own.

## 2017-08-02 NOTE — ED PROVIDER NOTES
Encounter Date: 8/2/2017    SCRIBE #1 NOTE: I, Angelica Castellano, am scribing for, and in the presence of,  Robert Hanson MD . I have scribed the following portions of the note - Other sections scribed: HPI and ROS .       History     Chief Complaint   Patient presents with    Alcohol Problem     Pt was found at a gas station parking lot; hx of haldol abuse; pt denies drug/alcohol use. pt is restless in stretcher. Hx of bipolar     CC: Abnormal Behavior (Initial call for Public Intoxication)  History obtained from patient.  Pt arrived via EMS transportation.     HPI: This 41 y.o. Female, who has Anxiety, Bipolar I Disorder, CKD, Endometriosis, and a history of ovarian cancer, presents to the ED for evaluation after she was found at a gas station acting erratically. EMS was activated. The patient states she walked to the gas station from her home, which is approximately 5-7 minutes away from the gas station. The patient reports that she recently discontinued use of Prozac and Risperdal. She states that she does not take Klonopin 3 times per day as described and only takes this about 2 times daily. The patient denies any alcohol or drug abuse; however, per EMS, the patient has a history of drug abuse. The patient denies depression, suicidal ideation, or homicidal ideation. The patient reports no symptoms at this time.           Review of patient's allergies indicates:   Allergen Reactions    Metaxalone      Other reaction(s): Anaphylaxis    Soma [carisoprodol]      Other reaction(s): Anaphylaxis    Penicillins      Other reaction(s): Unknown     Past Medical History:   Diagnosis Date    Chronic kidney disease     NEPHROLITHIASIS    Endometriosis of pelvis     H/O colonoscopy 1/10/14    INTERNAL HEMORRHOID    History of psychiatric hospitalization     Hx of psychiatric care     Lactose intolerance     Ovarian cancer     Psychiatric problem     Therapy      Past Surgical History:   Procedure Laterality Date     APPENDECTOMY       SECTION      CHOLECYSTECTOMY      HYSTERECTOMY      TUBAL LIGATION       Family History   Problem Relation Age of Onset    Mental illness Mother     Bipolar disorder Mother     Depression Mother     Heart disease Father      defibrillator    Cancer Maternal Grandmother      ovarian cancer    Bipolar disorder Paternal Grandmother      Social History   Substance Use Topics    Smoking status: Current Every Day Smoker     Packs/day: 0.50     Years: 22.00    Smokeless tobacco: Current User    Alcohol use No      Comment: rarely     Review of Systems   Constitutional: Negative for fever.   HENT: Negative for congestion and rhinorrhea.    Eyes: Negative for redness.   Respiratory: Negative for cough and shortness of breath.    Cardiovascular: Negative for chest pain.   Gastrointestinal: Negative for vomiting.   Musculoskeletal: Negative for myalgias.   Skin: Negative for rash.   Neurological: Negative for weakness.   Psychiatric/Behavioral: Negative for agitation, dysphoric mood and suicidal ideas. The patient is not nervous/anxious.        Physical Exam     Initial Vitals [17 1229]   BP Pulse Resp Temp SpO2   123/81 (!) 112 (!) 21 98.5 °F (36.9 °C) 96 %      MAP       95         Physical Exam    Nursing note and vitals reviewed.  Constitutional:  Disheveled middle-aged female in no obvious discomfort   HENT:    Head: NC/AT    Eyes: Conjunctivae normal.   (-) scleral icterus.              Mouth/Throat: Dry mucosal membranes.      Neck: Neck supple, normal rom.    Cardiovascular: Tachycardic, regular rhythm  Pulmonary/Chest: CTAB   Abdominal: Soft. ND/NT   (-) CVA tenderness.  Musculoskeletal: FROM of all major joints. No extremity edema or tenderness.  Neurological: A&Ox3,   Erratic voluntary muscle movements.  No acute focal motor deficits.     Skin: Skin is warm and dry.   Psychiatric:  Pressured speech.  Tangential thoughts.           ED Course   Procedures  Labs  Reviewed   COMPREHENSIVE METABOLIC PANEL - Abnormal; Notable for the following:        Result Value    Glucose 134 (*)     Anion Gap 7 (*)     eGFR if non  57 (*)     All other components within normal limits   TSH - Abnormal; Notable for the following:     TSH 0.292 (*)     All other components within normal limits   URINALYSIS - Abnormal; Notable for the following:     Ketones, UA Trace (*)     All other components within normal limits   ACETAMINOPHEN LEVEL - Abnormal; Notable for the following:     Acetaminophen (Tylenol), Serum <3.0 (*)     All other components within normal limits   CBC W/ AUTO DIFFERENTIAL   DRUG SCREEN PANEL, URINE EMERGENCY   ALCOHOL,MEDICAL (ETHANOL)   T4, FREE             Medical Decision Making:   History:   I obtained history from: EMS provider.  Old Medical Records: I decided to obtain old medical records.  Old Records Summarized: other records.  Clinical Tests:   Lab Tests: Ordered and Reviewed  Radiological Study: Reviewed  Medical Tests: Reviewed    Additional Medical Decision Making:   Emergent evaluation of a 41-year-old female with history of bipolar disorder, endometriosis, and polysubstance abuse who presents to the emergency department via EMS after evaluation of suspected public intoxication - see hpi for additional details.     Drug screen positive for benzodiazepines and amphetamines.  Ethanol level undetected.   Basic labs wnls.  Findings at this time are most consistent with acute intoxication, likely related to polypharmacy.  She will be observed in the ED until sobriety achieved.      Per [facility] Police, she has a number of outstanding warrents for her arrest.  Consequently, they wish to be contacted prior to her discharge.   ([phone])            Scribe Attestation:   Scribe #1: I performed the above scribed service and the documentation accurately describes the services I performed. I attest to the accuracy of the note.    Attending  Attestation:           Physician Attestation for Scribe:  Physician Attestation Statement for Scribe #1: I, Robert Hanson MD, reviewed documentation, as scribed by Angelica Castellano  in my presence, and it is both accurate and complete.                 ED Course     Clinical Impression:   There were no encounter diagnoses.                           Robert Hanson MD  08/02/17 2059

## 2017-08-02 NOTE — SUBJECTIVE & OBJECTIVE
Patient History           Medical as of 2017     Past Medical History Date Comments Source    Chronic kidney disease -- NEPHROLITHIASIS Provider    Endometriosis of pelvis -- -- Provider    H/O colonoscopy 1/10/14 INTERNAL HEMORRHOID Provider    History of psychiatric hospitalization -- -- Provider    Hx of psychiatric care -- -- Provider    Lactose intolerance -- -- Provider    Ovarian cancer -- -- Provider    Psychiatric problem -- -- Provider    Therapy -- -- Provider    Pertinent Negatives Date Noted Comments Source    Suicide attempt 2017 -- Provider            Surgical as of 2017     Past Surgical History Laterality Date Comments Source     SECTION -- -- -- Provider    TUBAL LIGATION -- -- -- Provider    HYSTERECTOMY -- -- -- Provider    APPENDECTOMY -- -- -- Provider    CHOLECYSTECTOMY -- -- -- Provider            Family as of 2017     Problem Relation Name Age of Onset Comments Source    Mental illness Mother -- -- -- Provider    Bipolar disorder Mother -- -- -- Provider    Depression Mother -- -- -- Provider    Heart disease Father -- -- defibrillator Provider    Cancer Maternal Grandmother -- -- ovarian cancer Provider    Bipolar disorder Paternal Grandmother -- -- -- Provider            Tobacco Use as of 2017     Smoking Status Smoking Start Date Smoking Quit Date Packs/day Years Used    Current Every Day Smoker -- -- 0.50 22.00    Types Comments Smokeless Tobacco Status Smokeless Tobacco Quit Date Source    -- -- Current User -- Provider            Alcohol Use as of 2017     Alcohol Use Drinks/Week Alcohol/Week Comments Source    No -- -- rarely Provider            Drug Use as of 2017     Drug Use Types Frequency Comments Source    Yes -- -- uses all types of drug and prescription medications Provider            Sexual Activity as of 2017     Sexually Active Birth Control Partners Comments Source    Yes None Male -- Provider            Activities of Daily  Living as of 8/2/2017     Activities of Daily Living Question Response Comments Source    Patient feels they ought to cut down on drinking/drug use Not Asked -- Provider    Patient annoyed by others criticizing their drinking/drug use Not Asked -- Provider    Patient has felt bad or guilty about drinking/drug use Not Asked -- Provider    Patient has had a drink/used drugs as an eye opener in the AM Not Asked -- Provider            Social Documentation as of 8/2/2017    **None**           Occupational as of 8/2/2017     Occupation Employer Comments Source    none -- -- Provider            Socioeconomic as of 8/2/2017     Marital Status Spouse Name Number of Children Years Education Preferred Language Ethnicity Race Source     -- 3 -- English /White White Provider         Additional Pertinent History Q A Comments    as of 8/2/2017 Place in Birth Order      Number of Siblings      Raised by      Childhood Trauma      Financial Status unemployed     Highest Level of Education      Lives with      Lives in home     Criminal History of      Legal Involvement      Does patient have access to a firearm?       Service      Primary Leisure Activity      Spirituality      Caffeine Use         Review of patient's allergies indicates:   Allergen Reactions    Metaxalone      Other reaction(s): Anaphylaxis    Soma [carisoprodol]      Other reaction(s): Anaphylaxis    Penicillins      Other reaction(s): Unknown       No current facility-administered medications on file prior to encounter.      Current Outpatient Prescriptions on File Prior to Encounter   Medication Sig    acetaminophen (TYLENOL) 325 MG tablet Take 325 mg by mouth every 6 (six) hours as needed for Pain.    clonazepam (KLONOPIN) 0.5 MG tablet Take 0.5 mg by mouth 2 (two) times daily.    estradiol (EVAMIST) 1.53 mg/spray (1.7%) transdermal spray Place 3 sprays onto the skin once daily.    ibuprofen (ADVIL,MOTRIN) 200 MG tablet Take 200  "mg by mouth every 6 (six) hours as needed for Pain.    naproxen (NAPROSYN) 500 MG tablet Take 1 tablet (500 mg total) by mouth 2 (two) times daily with meals.    pantoprazole (PROTONIX) 20 MG tablet Take 2 tablets (40 mg total) by mouth once daily.     Psychotherapeutics     None        Review of Systems   Unable to perform ROS: Psychiatric disorder     Objective:     Vital Signs (Most Recent):  Temp: 98.5 °F (36.9 °C) (08/02/17 1229)  Pulse: (!) 112 (08/02/17 1229)  Resp: (!) 21 (08/02/17 1229)  BP: 123/81 (08/02/17 1229)  SpO2: 96 % (08/02/17 1229) Vital Signs (24h Range):  Temp:  [98.5 °F (36.9 °C)] 98.5 °F (36.9 °C)  Pulse:  [112] 112  Resp:  [21] 21  SpO2:  [96 %] 96 %  BP: (123)/(81) 123/81     Height: 5' 4" (162.6 cm)  Weight: 73.9 kg (163 lb)  Body mass index is 27.98 kg/m².    No intake or output data in the 24 hours ending 08/02/17 1516    Physical Exam   Psychiatric:   EXAMINATION    CONSTITUTIONAL  General Appearance: disheveled; unkept    MUSCULOSKELETAL  Muscle Strength and Tone: normal  Abnormal Involuntary Movements: fidgety, flailing arms, restless, abnormal grimacing and facial movements  Gait and Station: flailing walk    PSYCHIATRIC MENTAL STATUS EXAM   Level of Consciousness: awake and alert  Orientation: fully oriented  Grooming: appears to have hygiene but not fully kept  Psychomotor Behavior: restless, fidgety  Speech: pressured  Language: no abnormalities  Mood: "good"  Affect: odd, anxious, restless  Thought Process: tangential  Associations: linear with tangentiality   Thought Content: denies suicidal/homicidal/psychosis  Memory: intact to recent and remote  Attention: easily distracted  Fund of Knowledge: intact to conversation  Insight: poor  Judgment: poor              Significant Labs:   Last 24 Hours:   Recent Lab Results       08/02/17  1345 08/02/17  1337      Benzodiazepines Presumptive Positive      Methadone metabolites Negative      Phencyclidine Negative      Acetaminophen " (Tylenol), Serum  <3.0  Comment:  Toxic Levels:  Adults (4 hr post-ingestion).........>150 ug/mL  Adults (12 hr post-ingestion)........>40 ug/mL  Peds (2 hr post-ingestion, liquid)...>225 ug/mL  (L)     Albumin  4.2     Alcohol, Medical, Serum  <10     Alkaline Phosphatase  88     ALT  33     Amphetamine Screen, Ur Presumptive Positive      Anion Gap  7(L)     Appearance, UA Clear      AST  29     Barbiturate Screen, Ur Negative      Baso #  0.03     Basophil%  0.4     Bilirubin (UA) Negative      Total Bilirubin  0.7  Comment:  For infants and newborns, interpretation of results should be based  on gestational age, weight and in agreement with clinical  observations.  Premature Infant recommended reference ranges:  Up to 24 hours.............<8.0 mg/dL  Up to 48 hours............<12.0 mg/dL  3-5 days..................<15.0 mg/dL  6-29 days.................<15.0 mg/dL       BUN, Bld  16     Calcium  9.1     Chloride  105     CO2  29     Cocaine (Metab.) Negative      Color, UA Yellow      Creatinine  1.2     Creatinine, Random Ur 219.0  Comment:  The random urine reference ranges provided were established   for 24 hour urine collections.  No reference ranges exist for  random urine specimens.  Correlate clinically.        Differential Method  Automated     eGFR if   >60     eGFR if non   57  Comment:  Calculation used to obtain the estimated glomerular filtration  rate (eGFR) is the CKD-EPI equation. Since race is unknown   in our information system, the eGFR values for   -American and Non--American patients are given   for each creatinine result.  (A)     Eos #  0.0     Eosinophil%  0.3     Free T4  1.26     Glucose  134(H)     Glucose, UA Negative      Gran #  4.7     Gran%  68.8     Hematocrit  38.6     Hemoglobin  12.5     Ketones, UA Trace(A)      Leukocytes, UA Negative      Lymph #  1.4     Lymph%  21.1     MCH  29.7     MCHC  32.4     MCV  92     Mono #  0.6      Mono%  9.3     MPV  11.6     Nitrite, UA Negative      Occult Blood UA Negative      Opiate Scrn, Ur Negative      pH, UA 5.0      Platelets  252     Potassium  3.5     Total Protein  7.1     Protein, UA Negative  Comment:  Recommend a 24 hour urine protein or a urine   protein/creatinine ratio if globulin induced proteinuria is  clinically suspected.        RBC  4.21     RDW  13.5     Sodium  141     Specific Gravity, UA 1.030      Specimen UA Urine, Clean Catch      Marijuana (THC) Metabolite Negative      Toxicology Information SEE COMMENT  Comment:  This screen includes the following classes of drugs at the   listed cut-off:  Benzodiazepines                  200 ng/ml  Methadone                        300 ng/ml  Cocaine metabolite               300 ng/ml  Opiates                          300 ng/ml  Barbiturates                     200 ng/ml  Amphetamines                    1000 ng/ml  Marijuana metabs (THC)            50 ng/ml  Phencyclidine (PCP)               25 ng/ml  High concentrations of Diphenhydramine may cross-react with  Phencyclidine PCP screening immunoassay giving a false   positive result.  High concentrations of Methylenedioxymethamphetamine (MDMA aka  Ectasy) and other structurally similar compounds may cross-   react with the Amphetamine/Methamphetamine screening   immunoassay giving a false positive result.  A metabolite of the anti-HIV drug Sustiva () may cause  false positive results in the Marijuana metabolite (THC)   screening assay.  Note: This exception list includes only more common   interferants in toxicology screen testing.  Because of many   cross-reactantspositive results on toxicology drug screens   should be confirmed whenever results do not correlate with   clinical presentation.  This report is intended for use in clinical monitoring and  management of patients. It is not intended for use in   employment related drug testing.  Because of any cross-reactants, positive  results on toxicology  drug screens should be confirmed whenever results do not  correlate with clinical presentation.  Presumptive positive results are unconfirmed and may be used   only for medical purposes.        TSH  0.292(L)     Urobilinogen, UA Negative      WBC  6.77         All pertinent labs within the past 24 hours have been reviewed.    Significant Imaging: I have reviewed all pertinent imaging results/findings within the past 24 hours.

## 2017-08-03 NOTE — ED NOTES
Pt alert and oriented x 4. Pt ambulated around nurses station with steady gait and answered all questions appropriately. Pt is calm and cooperative.

## 2017-08-03 NOTE — ED NOTES
[facility]  [facility]. They are at bedside speaking with pt

## 2017-08-03 NOTE — ED NOTES
Received report from JOVAN Bragg. Pt currently sleeping in bed. I was informed in report that pt is not allowed to leave and when she comes to and is medically cleared then to call police at [phone]. Per MD no visitors allowed.

## 2017-08-03 NOTE — ED NOTES
[facility] dept ([phone]) was called and notified pt was ready for discharge. Spoke to  11 and states that he will be sending someone to  pt.

## 2018-05-13 ENCOUNTER — HOSPITAL ENCOUNTER (EMERGENCY)
Facility: HOSPITAL | Age: 43
Discharge: HOME | End: 2018-05-13
Payer: MEDICAID

## 2018-05-13 VITALS
RESPIRATION RATE: 18 BRPM | SYSTOLIC BLOOD PRESSURE: 141 MMHG | HEART RATE: 82 BPM | WEIGHT: 178 LBS | OXYGEN SATURATION: 99 % | DIASTOLIC BLOOD PRESSURE: 83 MMHG | BODY MASS INDEX: 31.54 KG/M2 | HEIGHT: 63 IN | TEMPERATURE: 98 F

## 2018-05-13 DIAGNOSIS — M79.621 PAIN IN RIGHT AXILLA: ICD-10-CM

## 2018-05-13 DIAGNOSIS — R19.7 DIARRHEA, UNSPECIFIED TYPE: Primary | ICD-10-CM

## 2018-05-13 DIAGNOSIS — R10.9 ABDOMINAL PAIN, UNSPECIFIED ABDOMINAL LOCATION: ICD-10-CM

## 2018-05-13 LAB
ALBUMIN SERPL BCP-MCNC: 3.9 G/DL
ALP SERPL-CCNC: 109 U/L
ALT SERPL W/O P-5'-P-CCNC: 188 U/L
ANION GAP SERPL CALC-SCNC: 9 MMOL/L
AST SERPL-CCNC: 32 U/L
BASOPHILS # BLD AUTO: 0.03 K/UL
BASOPHILS NFR BLD: 0.4 %
BILIRUB SERPL-MCNC: 0.3 MG/DL
BILIRUB UR QL STRIP: NEGATIVE
BUN SERPL-MCNC: 23 MG/DL
CALCIUM SERPL-MCNC: 9.5 MG/DL
CHLORIDE SERPL-SCNC: 103 MMOL/L
CLARITY UR: CLEAR
CO2 SERPL-SCNC: 24 MMOL/L
COLOR UR: YELLOW
CREAT SERPL-MCNC: 0.9 MG/DL
DIFFERENTIAL METHOD: NORMAL
EOSINOPHIL # BLD AUTO: 0.2 K/UL
EOSINOPHIL NFR BLD: 2.2 %
ERYTHROCYTE [DISTWIDTH] IN BLOOD BY AUTOMATED COUNT: 13.9 %
EST. GFR  (AFRICAN AMERICAN): >60 ML/MIN/1.73 M^2
EST. GFR  (NON AFRICAN AMERICAN): >60 ML/MIN/1.73 M^2
GLUCOSE SERPL-MCNC: 99 MG/DL
GLUCOSE UR QL STRIP: NEGATIVE
HCT VFR BLD AUTO: 42.3 %
HGB BLD-MCNC: 14 G/DL
HGB UR QL STRIP: NEGATIVE
KETONES UR QL STRIP: NEGATIVE
LEUKOCYTE ESTERASE UR QL STRIP: NEGATIVE
LIPASE SERPL-CCNC: 32 U/L
LYMPHOCYTES # BLD AUTO: 1.9 K/UL
LYMPHOCYTES NFR BLD: 25.4 %
MCH RBC QN AUTO: 29.1 PG
MCHC RBC AUTO-ENTMCNC: 33.1 G/DL
MCV RBC AUTO: 88 FL
MONOCYTES # BLD AUTO: 0.6 K/UL
MONOCYTES NFR BLD: 7.4 %
NEUTROPHILS # BLD AUTO: 4.9 K/UL
NEUTROPHILS NFR BLD: 64.2 %
NITRITE UR QL STRIP: NEGATIVE
PH UR STRIP: 5 [PH] (ref 5–8)
PLATELET # BLD AUTO: 296 K/UL
PMV BLD AUTO: 10.8 FL
POTASSIUM SERPL-SCNC: 4.4 MMOL/L
PROT SERPL-MCNC: 7.5 G/DL
PROT UR QL STRIP: NEGATIVE
RBC # BLD AUTO: 4.81 M/UL
SODIUM SERPL-SCNC: 136 MMOL/L
SP GR UR STRIP: 1.01 (ref 1–1.03)
URN SPEC COLLECT METH UR: NORMAL
UROBILINOGEN UR STRIP-ACNC: NEGATIVE EU/DL
WBC # BLD AUTO: 7.56 K/UL

## 2018-05-13 PROCEDURE — 87045 FECES CULTURE AEROBIC BACT: CPT

## 2018-05-13 PROCEDURE — 96361 HYDRATE IV INFUSION ADD-ON: CPT

## 2018-05-13 PROCEDURE — 80053 COMPREHEN METABOLIC PANEL: CPT

## 2018-05-13 PROCEDURE — 87427 SHIGA-LIKE TOXIN AG IA: CPT

## 2018-05-13 PROCEDURE — 87186 SC STD MICRODIL/AGAR DIL: CPT

## 2018-05-13 PROCEDURE — 87077 CULTURE AEROBIC IDENTIFY: CPT

## 2018-05-13 PROCEDURE — 96372 THER/PROPH/DIAG INJ SC/IM: CPT | Mod: 59

## 2018-05-13 PROCEDURE — 81003 URINALYSIS AUTO W/O SCOPE: CPT

## 2018-05-13 PROCEDURE — 99284 EMERGENCY DEPT VISIT MOD MDM: CPT | Mod: 25

## 2018-05-13 PROCEDURE — 96374 THER/PROPH/DIAG INJ IV PUSH: CPT

## 2018-05-13 PROCEDURE — 87046 STOOL CULTR AEROBIC BACT EA: CPT

## 2018-05-13 PROCEDURE — 63600175 PHARM REV CODE 636 W HCPCS: Performed by: NURSE PRACTITIONER

## 2018-05-13 PROCEDURE — 85025 COMPLETE CBC W/AUTO DIFF WBC: CPT

## 2018-05-13 PROCEDURE — 83690 ASSAY OF LIPASE: CPT

## 2018-05-13 PROCEDURE — 25000003 PHARM REV CODE 250: Performed by: NURSE PRACTITIONER

## 2018-05-13 RX ORDER — ONDANSETRON 2 MG/ML
4 INJECTION INTRAMUSCULAR; INTRAVENOUS
Status: COMPLETED | OUTPATIENT
Start: 2018-05-13 | End: 2018-05-13

## 2018-05-13 RX ORDER — ONDANSETRON 4 MG/1
4 TABLET, ORALLY DISINTEGRATING ORAL EVERY 8 HOURS PRN
Qty: 10 TABLET | Refills: 0 | Status: SHIPPED | OUTPATIENT
Start: 2018-05-13 | End: 2019-01-04

## 2018-05-13 RX ORDER — DICYCLOMINE HYDROCHLORIDE 10 MG/ML
20 INJECTION INTRAMUSCULAR
Status: DISCONTINUED | OUTPATIENT
Start: 2018-05-13 | End: 2018-05-13

## 2018-05-13 RX ORDER — DICYCLOMINE HYDROCHLORIDE 10 MG/ML
10 INJECTION INTRAMUSCULAR
Status: COMPLETED | OUTPATIENT
Start: 2018-05-13 | End: 2018-05-13

## 2018-05-13 RX ORDER — LIDOCAINE 50 MG/G
1 PATCH TOPICAL
Status: DISCONTINUED | OUTPATIENT
Start: 2018-05-13 | End: 2018-05-13 | Stop reason: HOSPADM

## 2018-05-13 RX ORDER — DICYCLOMINE HYDROCHLORIDE 20 MG/1
20 TABLET ORAL 2 TIMES DAILY
Qty: 20 TABLET | Refills: 0 | Status: SHIPPED | OUTPATIENT
Start: 2018-05-13 | End: 2018-06-12

## 2018-05-13 RX ADMIN — LIDOCAINE 1 PATCH: 50 PATCH TOPICAL at 10:05

## 2018-05-13 RX ADMIN — ONDANSETRON 4 MG: 2 INJECTION INTRAMUSCULAR; INTRAVENOUS at 08:05

## 2018-05-13 RX ADMIN — SODIUM CHLORIDE 1000 ML: 0.9 INJECTION, SOLUTION INTRAVENOUS at 09:05

## 2018-05-13 RX ADMIN — LIDOCAINE HYDROCHLORIDE: 20 SOLUTION ORAL; TOPICAL at 09:05

## 2018-05-13 RX ADMIN — DICYCLOMINE HYDROCHLORIDE 10 MG: 10 INJECTION INTRAMUSCULAR at 09:05

## 2018-05-13 NOTE — ED TRIAGE NOTES
C/o abd pain, N/V/D  x 3 days. Diarrhea x 15 today.  Nausea today. No vomiting today.  No OTC meds taken today. .  ALSO, c/o swelling and pain to  Rt. Armpit ángela when lying down x 3 days. Reports lying supine  Increases diarrhea episodes.

## 2018-05-13 NOTE — DISCHARGE INSTRUCTIONS
Your lab results and CT scan in the emergency department were normal.  You will need to let your diarrhea run its course.  However you will need to follow up with her primary care physician as soon as possible preferably within 1 day.  I have provided some medication for pain and nausea.  Please take this as directed and as needed.  Please follow a low residue diet.  This information was provided within this discharge paperwork.  Your stool cultures should be back within the next 3 days.  We will contact you if it is positive. Please stay well hydrated.    Please return to the Emergency Department for any new or worsening symptoms including: fever, chest pain, shortness of breath, loss of consciousness, dizziness, weakness, or any other concerns.     Please follow up with your Primary Care Provider within in the week. If you do not have one, you may contact the one listed on your discharge paperwork or you may also call the [facility] Appointment Desk at [phone] to schedule an appointment with one.     Please take all medication as prescribed.

## 2018-05-13 NOTE — ED PROVIDER NOTES
Encounter Date: 5/13/2018       History     Chief Complaint   Patient presents with    Abdominal Pain     Pt. reports inability to keep anything down for the last 3 days, pt. reports that when she lays down the nausea becomes worse and she vomits. Pt. indicates upper gastric pain rates pain 8/10, she reports pain radiates to back and under left arm pit.    Diarrhea     This is a 42-year-old female presents to the emergency department for emergent evaluation of abdominal pain and diarrhea x3 days.  Patient reports she was in her normal state health up until 3 days ago at which time she started experiencing diffuse abdominal pain/cramping with several episodes of watery diarrhea as well as vomiting.  She subsequently took Pepto-Bismol and states she got better however she rebounded quickly after the Pepto-Bismol wore off.  She also reports having this pain in her right axilla with associated not the that radiates from the right axilla to the right breast/chest wall that she states is a  deep ache that is so intense I cannot even put my deodorant on.  She rates her pain as severe to the abdomen and right axilla as 7/10.  She reports approximately 2-3 episodes of watery diarrhea yesterday however states she has been to the bathroom approximately 15 times since midnight last night with profuse watery diarrhea.  She denies any sick contacts, any unusual foods, any eating out at restaurants.  States she has been unable to tolerate anything by mouth today however she has not vomited.  She denies fever, chest pain, shortness of breath, numbness or tingling, rashes, or any other associated symptoms. Patient had a normal colonoscopy in 2012.          Review of patient's allergies indicates:   Allergen Reactions    Metaxalone      Other reaction(s): Anaphylaxis    Soma [carisoprodol]      Other reaction(s): Anaphylaxis    Penicillins      Other reaction(s): Unknown     Past Medical History:   Diagnosis Date     Chronic kidney disease     NEPHROLITHIASIS    Endometriosis of pelvis     H/O colonoscopy 1/10/14    INTERNAL HEMORRHOID    History of psychiatric hospitalization     Hx of psychiatric care     Lactose intolerance     Ovarian cancer     Psychiatric problem     Therapy      Past Surgical History:   Procedure Laterality Date    APPENDECTOMY       SECTION      CHOLECYSTECTOMY      HYSTERECTOMY      TUBAL LIGATION       Family History   Problem Relation Age of Onset    Mental illness Mother     Bipolar disorder Mother     Depression Mother     Heart disease Father         defibrillator    Cancer Maternal Grandmother         ovarian cancer    Bipolar disorder Paternal Grandmother      Social History   Substance Use Topics    Smoking status: Former Smoker     Packs/day: 0.50     Years: 22.00    Smokeless tobacco: Current User    Alcohol use No     Review of Systems   Constitutional: Negative for chills, fatigue and fever.   HENT: Negative for congestion and sore throat.    Respiratory: Negative for cough, choking, chest tightness, shortness of breath and wheezing.    Cardiovascular: Negative for chest pain, palpitations and leg swelling.   Gastrointestinal: Positive for abdominal pain, diarrhea, nausea and vomiting. Negative for abdominal distention, anal bleeding, blood in stool, constipation and rectal pain.   Genitourinary: Negative for decreased urine volume, difficulty urinating, dyspareunia, dysuria, flank pain, frequency, hematuria, urgency, vaginal bleeding, vaginal discharge and vaginal pain.   Musculoskeletal: Negative for arthralgias, back pain, gait problem, myalgias, neck pain and neck stiffness.        Right axilla pain radiating to right breast   Skin: Negative for rash.   Neurological: Negative for dizziness, syncope, weakness, light-headedness, numbness and headaches.   Hematological: Does not bruise/bleed easily.       Physical Exam     Initial Vitals [18 0752]   BP  Pulse Resp Temp SpO2   (!) 141/93 82 18 97.5 °F (36.4 °C) 99 %      MAP       109         Physical Exam    Nursing note and vitals reviewed.  Constitutional: Vital signs are normal. She appears well-developed and well-nourished. She is cooperative.  Non-toxic appearance. She does not have a sickly appearance. She does not appear ill. No distress.   HENT:   Head: Normocephalic and atraumatic.   Mouth/Throat: Uvula is midline, oropharynx is clear and moist and mucous membranes are normal.   Eyes: Conjunctivae and EOM are normal. Pupils are equal, round, and reactive to light.   Neck: Normal range of motion and full passive range of motion without pain. Neck supple.   Cardiovascular: Normal rate, regular rhythm, normal heart sounds, intact distal pulses and normal pulses. Exam reveals no decreased pulses.    No murmur heard.  Pulmonary/Chest: Effort normal and breath sounds normal. No respiratory distress. She has no decreased breath sounds. She has no wheezes. She exhibits no tenderness.   Abdominal: Soft. Normal appearance. She exhibits no distension and no mass. Bowel sounds are increased. There is no hepatosplenomegaly. There is generalized tenderness and tenderness in the left lower quadrant. There is no rigidity, no rebound, no guarding, no CVA tenderness, no tenderness at McBurney's point and negative Barr's sign. No hernia.       Diffuse abdominal tenderness however is most severe in the left lower quadrant.   Musculoskeletal: Normal range of motion.        Arms:  Right axilla pain to palpation which radiates to the right lateral breast, no obvious erythema, edema or ecchymosis, tenderness is most pronounced with deep palpation    Lymphadenopathy:        Head (right side): No submental, no submandibular, no tonsillar and no occipital adenopathy present.        Head (left side): No submental, no submandibular, no tonsillar and no occipital adenopathy present.     She has no cervical adenopathy.        Right  cervical: No superficial cervical adenopathy present.       Left cervical: No superficial cervical adenopathy present.     She has no axillary adenopathy.        Right axillary: No pectoral adenopathy present.        Left axillary: No pectoral adenopathy present.  Neurological: She is alert and oriented to person, place, and time. She has normal strength and normal reflexes. No cranial nerve deficit or sensory deficit. Coordination and gait normal. GCS eye subscore is 4. GCS verbal subscore is 5. GCS motor subscore is 6.   Skin: Skin is warm, dry and intact. Capillary refill takes less than 2 seconds. No rash noted. No pallor.   Mucous membranes are moist without signs of dehydration   Psychiatric: She has a normal mood and affect. Her speech is normal and behavior is normal. Judgment and thought content normal. Cognition and memory are normal.         ED Course   Procedures  Labs Reviewed   COMPREHENSIVE METABOLIC PANEL - Abnormal; Notable for the following:        Result Value    BUN, Bld 23 (*)      (*)     All other components within normal limits   CULTURE, STOOL   ENTEROHEMORRHAGIC E.COLI   CBC W/ AUTO DIFFERENTIAL   LIPASE   URINALYSIS                   APC / Resident Notes:   Miriam Reyes is a 42 y.o. female who presents to the Emergency Department for evaluation of  abdominal pain and diarrhea x3 days.  She also reports right axilla pain and knot .    Physical Exam shows a non-toxic, afebrile, and well appearing female. Pertinent exam findings include no evidence of erythema, edema, ecchymosis to right axilla, no palpable mass to area or right breast, there is tenderness to palpation to area.  Abdomen is tender left lower quadrant however remaining abdomen is soft and nontender, no evidence of rashes or lesions, no palpable masses, no rigidity, guarding , rebound or reason to suggest surgical abdomen.  No CVA tenderness. Bowel sounds are present all quadrants.    Vital Signs Are Reassuring. If  available, previous records reviewed.     RESULTS:  CBC unremarkable, CMP shows slightly elevated BUN at 23 and ALT of 188.  Lipase normal, stool cultures pending, urinalysis unremarkable, CT abdomen and pelvis shows no acute pathology noted within the abdomen /pelvis.    My overall impression is abdominal pain, diarrhea, suspect most likely related to a virus. I considered but do not suspect at this time diverticulitis, colitis, C diff.     The patient was deemed safe and stable for discharge.    ED Course:  Lidoderm patch to right axilla, GI cocktail, Bentyl, Zofran, normal saline.  Patient reports moderate relief of pain after administration medications.  She has had 2 episodes of diarrhea while in emergency department.  She denies blood in urine or stool.  D/C Meds:  Zofran, Bentyl.  Additional D/C Information:  The patient was instructed to follow up with primary care physician and gastroenterologist for continued care and management.  Take medications as prescribed, use heating pads to right axilla, follow up with OBGYN or primary care for this pain of for mammogram and ultrasound.  Strict ED return precautions given.  The diagnosis, treatment plan, instructions for follow-up and reevaluation with PCP as well as ED return precautions were discussed and understanding was verbalized. All questions or concerns have been addressed. Patient was discharged home with an instructional sheet which gave not only information regarding the most likely diagnoses but also information regarding when to return to the emergency department for alarming symptoms and when to seek further care.      This case was discussed with Dr. casillas who is in agreement with my assessment and plan.                    Clinical Impression:   The primary encounter diagnosis was Diarrhea, unspecified type. Diagnoses of Abdominal pain, unspecified abdominal location and Pain in right axilla were also pertinent to this visit.    Disposition:    Disposition: Discharged  Condition: Stable                        Salima De Luna, MONICA  05/13/18 [id number]

## 2018-05-14 LAB
E COLI SXT1 STL QL IA: NEGATIVE
E COLI SXT2 STL QL IA: NEGATIVE

## 2018-05-16 DIAGNOSIS — R74.8 ELEVATED LIVER ENZYMES: Primary | ICD-10-CM

## 2018-05-16 LAB — BACTERIA STL CULT: NORMAL

## 2018-12-14 ENCOUNTER — NURSE TRIAGE (OUTPATIENT)
Dept: ADMINISTRATIVE | Facility: CLINIC | Age: 43
End: 2018-12-14

## 2018-12-14 NOTE — TELEPHONE ENCOUNTER
Reason for Disposition   Wheezing is present    Protocols used: ST COUGH-A-OH    Pt reports cough, congestion, wheezing, and ear pain. Chest pain with coughing only. Advised that she needed to be seen now. No appts available for new patient until Janurary. Advised UC or ER

## 2019-01-04 ENCOUNTER — HOSPITAL ENCOUNTER (EMERGENCY)
Facility: HOSPITAL | Age: 44
Discharge: HOME | End: 2019-01-04
Attending: EMERGENCY MEDICINE
Payer: MEDICAID

## 2019-01-04 VITALS
RESPIRATION RATE: 16 BRPM | SYSTOLIC BLOOD PRESSURE: 122 MMHG | BODY MASS INDEX: 33.66 KG/M2 | DIASTOLIC BLOOD PRESSURE: 87 MMHG | HEART RATE: 78 BPM | WEIGHT: 190 LBS | HEIGHT: 63 IN | TEMPERATURE: 97 F | OXYGEN SATURATION: 96 %

## 2019-01-04 DIAGNOSIS — R05.9 COUGH: ICD-10-CM

## 2019-01-04 DIAGNOSIS — R09.1 PLEURISY: Primary | ICD-10-CM

## 2019-01-04 LAB
ALBUMIN SERPL BCP-MCNC: 3.6 G/DL
ALP SERPL-CCNC: 88 U/L
ALT SERPL W/O P-5'-P-CCNC: 18 U/L
ANION GAP SERPL CALC-SCNC: 12 MMOL/L
AST SERPL-CCNC: 24 U/L
BASOPHILS # BLD AUTO: 0.02 K/UL
BASOPHILS NFR BLD: 0.3 %
BILIRUB SERPL-MCNC: 0.1 MG/DL
BUN SERPL-MCNC: 17 MG/DL
CALCIUM SERPL-MCNC: 8.9 MG/DL
CHLORIDE SERPL-SCNC: 103 MMOL/L
CO2 SERPL-SCNC: 22 MMOL/L
CREAT SERPL-MCNC: 0.8 MG/DL
D DIMER PPP IA.FEU-MCNC: 0.6 MG/L FEU
DIFFERENTIAL METHOD: NORMAL
EOSINOPHIL # BLD AUTO: 0.1 K/UL
EOSINOPHIL NFR BLD: 1.9 %
ERYTHROCYTE [DISTWIDTH] IN BLOOD BY AUTOMATED COUNT: 13.7 %
EST. GFR  (AFRICAN AMERICAN): >60 ML/MIN/1.73 M^2
EST. GFR  (NON AFRICAN AMERICAN): >60 ML/MIN/1.73 M^2
GLUCOSE SERPL-MCNC: 97 MG/DL
HCT VFR BLD AUTO: 37.9 %
HGB BLD-MCNC: 13 G/DL
LYMPHOCYTES # BLD AUTO: 2.2 K/UL
LYMPHOCYTES NFR BLD: 36.6 %
MCH RBC QN AUTO: 30.2 PG
MCHC RBC AUTO-ENTMCNC: 34.3 G/DL
MCV RBC AUTO: 88 FL
MONOCYTES # BLD AUTO: 0.3 K/UL
MONOCYTES NFR BLD: 5.6 %
NEUTROPHILS # BLD AUTO: 3.3 K/UL
NEUTROPHILS NFR BLD: 55.6 %
PLATELET # BLD AUTO: 303 K/UL
PMV BLD AUTO: 10.6 FL
POTASSIUM SERPL-SCNC: 4.5 MMOL/L
PROT SERPL-MCNC: 7.3 G/DL
RBC # BLD AUTO: 4.31 M/UL
SODIUM SERPL-SCNC: 137 MMOL/L
TROPONIN I SERPL DL<=0.01 NG/ML-MCNC: <0.006 NG/ML
WBC # BLD AUTO: 5.88 K/UL

## 2019-01-04 PROCEDURE — 63600175 PHARM REV CODE 636 W HCPCS: Performed by: EMERGENCY MEDICINE

## 2019-01-04 PROCEDURE — 93005 ELECTROCARDIOGRAM TRACING: CPT

## 2019-01-04 PROCEDURE — 96374 THER/PROPH/DIAG INJ IV PUSH: CPT | Mod: 59

## 2019-01-04 PROCEDURE — 85379 FIBRIN DEGRADATION QUANT: CPT

## 2019-01-04 PROCEDURE — 93010 EKG 12-LEAD: ICD-10-PCS | Mod: ,,, | Performed by: INTERNAL MEDICINE

## 2019-01-04 PROCEDURE — 80053 COMPREHEN METABOLIC PANEL: CPT

## 2019-01-04 PROCEDURE — 96372 THER/PROPH/DIAG INJ SC/IM: CPT | Mod: 59

## 2019-01-04 PROCEDURE — 93010 ELECTROCARDIOGRAM REPORT: CPT | Mod: ,,, | Performed by: INTERNAL MEDICINE

## 2019-01-04 PROCEDURE — 84484 ASSAY OF TROPONIN QUANT: CPT

## 2019-01-04 PROCEDURE — 85025 COMPLETE CBC W/AUTO DIFF WBC: CPT

## 2019-01-04 PROCEDURE — 99285 EMERGENCY DEPT VISIT HI MDM: CPT | Mod: 25

## 2019-01-04 PROCEDURE — 25000003 PHARM REV CODE 250: Performed by: EMERGENCY MEDICINE

## 2019-01-04 RX ORDER — ACETAMINOPHEN 500 MG
1000 TABLET ORAL
Status: COMPLETED | OUTPATIENT
Start: 2019-01-04 | End: 2019-01-04

## 2019-01-04 RX ORDER — KETOROLAC TROMETHAMINE 30 MG/ML
15 INJECTION, SOLUTION INTRAMUSCULAR; INTRAVENOUS
Status: COMPLETED | OUTPATIENT
Start: 2019-01-04 | End: 2019-01-04

## 2019-01-04 RX ORDER — KETOROLAC TROMETHAMINE 30 MG/ML
15 INJECTION, SOLUTION INTRAMUSCULAR; INTRAVENOUS ONCE
Status: COMPLETED | OUTPATIENT
Start: 2019-01-04 | End: 2019-01-04

## 2019-01-04 RX ADMIN — ACETAMINOPHEN 1000 MG: 500 TABLET, FILM COATED ORAL at 06:01

## 2019-01-04 RX ADMIN — KETOROLAC TROMETHAMINE 15 MG: 30 INJECTION, SOLUTION INTRAMUSCULAR at 03:01

## 2019-01-04 RX ADMIN — KETOROLAC TROMETHAMINE 15 MG: 30 INJECTION, SOLUTION INTRAMUSCULAR at 01:01

## 2019-01-04 NOTE — ED PROVIDER NOTES
"Encounter Date: 2019    SCRIBE #1 NOTE: I, Wade Padilla, am scribing for, and in the presence of,  Phuong Angel MD. I have scribed the following portions of the note - Other sections scribed: HPI, ROS and PE.       History     Chief Complaint   Patient presents with    Shortness of Breath     pt here for SOB and left pain to posterior rib area x3 weeks. was evaluated at urgent care for cough and chest pain; given meds. states pain is no longer to misternal area, but wraps around left rib     CC: Shortness of Breath     HPI: This 43 y.o F former smoker with CKD, Endometriosis of pelvis and a hx of Ovarian cancer presents to the ED c/o acute onset of gradually worsening and severe (8/10) left lateral and posterior rib pain and SOB x3 weeks. Her pain is worse with deep breaths, coughing and particular movements. The pt reports a non-productive cough which began x1 month ago. She attempted tx with Advil cough and sinus, Mucinex and Delsym cough Syrup with no relief. She was then seen at an urgent care for her symptoms x2 weeks ago and Rx'ed a steroid injection, Z-pack and an albuterol inhaler. No imaging was performed. Her cough did improve, but her rib pain worsened. She attempted to treat her rib pain with Tylenol, Ibuprofen, a heating pad and ice pack which provided slight temporary relief. She also reports intermittent "hot flashes." She does note that she smoked cigarettes for several years and quit a few days prior to the onset of her symptoms. No recent long distance travel or periods of immobility. She denies drug use. She denies diaphoresis, nausea, emesis, diarrhea, chest pain, abdominal pain, weight loss, dysuria, difficulty urinating and rash.      PSHx:  section; Tubal ligation; Hysterectomy; Appendectomy; and Cholecystectomy.        The history is provided by the patient. No  was used.     Review of patient's allergies indicates:   Allergen Reactions    Metaxalone      " "Other reaction(s): Anaphylaxis    Soma [carisoprodol]      Other reaction(s): Anaphylaxis    Penicillins      Other reaction(s): Unknown     Past Medical History:   Diagnosis Date    Chronic kidney disease     NEPHROLITHIASIS    Endometriosis of pelvis     H/O colonoscopy 1/10/14    INTERNAL HEMORRHOID    History of psychiatric hospitalization     Hx of psychiatric care     Lactose intolerance     Ovarian cancer     Psychiatric problem     Therapy      Past Surgical History:   Procedure Laterality Date    APPENDECTOMY       SECTION      CHOLECYSTECTOMY      HYSTERECTOMY      TUBAL LIGATION       Family History   Problem Relation Age of Onset    Mental illness Mother     Bipolar disorder Mother     Depression Mother     Heart disease Father         defibrillator    Cancer Maternal Grandmother         ovarian cancer    Bipolar disorder Paternal Grandmother      Social History     Tobacco Use    Smoking status: Former Smoker     Packs/day: 0.50     Years: 22.00     Pack years: 11.00    Smokeless tobacco: Current User   Substance Use Topics    Alcohol use: No    Drug use: Yes     Comment: uses all types of drug and prescription medications     Review of Systems   Constitutional: Positive for fever (subjective, intermittent "hot flashes" ). Negative for chills and diaphoresis.   HENT: Negative for rhinorrhea and sore throat.    Eyes: Negative for redness.   Respiratory: Positive for cough and shortness of breath.    Cardiovascular: Negative for chest pain.   Gastrointestinal: Negative for abdominal pain, diarrhea, nausea and vomiting.   Genitourinary: Negative for dysuria, frequency and urgency.   Musculoskeletal: Negative for back pain and neck pain.   Skin: Negative for rash.   Psychiatric/Behavioral: The patient is not nervous/anxious.        Physical Exam     Initial Vitals [19 1232]   BP Pulse Resp Temp SpO2   (!) 142/99 88 20 98.4 °F (36.9 °C) 98 %      MAP       --     "     Physical Exam    Nursing note and vitals reviewed.  Constitutional: She is not diaphoretic. No distress.   HENT:   Head: Normocephalic and atraumatic.   Mouth/Throat: Oropharynx is clear and moist.   Eyes: EOM are normal. Pupils are equal, round, and reactive to light. No scleral icterus.   Neck: Normal range of motion. Neck supple. No JVD present.   Cardiovascular: Normal rate and regular rhythm.   Pulmonary/Chest: Breath sounds normal. No stridor. No respiratory distress.   Abdominal: Soft. Bowel sounds are normal. She exhibits no distension. There is no tenderness.   Musculoskeletal: Normal range of motion. She exhibits no edema or tenderness.   Tenderness over left lower 8th rib pin point on lateral side.    Neurological: She is alert and oriented to person, place, and time. She has normal strength. No cranial nerve deficit or sensory deficit.   Skin: Skin is warm and dry.   Psychiatric: She has a normal mood and affect.         ED Course   Procedures  Labs Reviewed   COMPREHENSIVE METABOLIC PANEL - Abnormal; Notable for the following components:       Result Value    CO2 22 (*)     All other components within normal limits   D DIMER, QUANTITATIVE - Abnormal; Notable for the following components:    D-Dimer 0.60 (*)     All other components within normal limits   CBC W/ AUTO DIFFERENTIAL   TROPONIN I     EKG Readings: (Independently Interpreted)   Previous EKG Date: 85 bpm. Rhythm: Normal Sinus Rhythm.   Non-specific T-wave abnormality        Imaging Results          NM Lung Ventilation Perfusion Imaging (Final result)  Result time 01/04/19 19:52:43    Final result by Travis Carbone MD (01/04/19 19:52:43)                 Impression:      Low probability V/Q scan for potential pulmonary embolus.      Electronically signed by: Travis Carbone MD  Date:    01/04/2019  Time:    19:52             Narrative:    EXAMINATION:  NM LUNG VENTILATION AND PERFUSION IMAGING    CLINICAL HISTORY:  postiive D dimer,  pleuritic pain;    TECHNIQUE:  10.3 mCi of xenon 133 aerosol and the subsequent IV administration of 5.4 mCi of Tc-99m-MAA, multiple images of the thorax were obtained in various projections.    COMPARISON:  None.    FINDINGS:  Normal perfusion is visualized with no mismatched perfusion defects.  Normal washout is seen at the end of ventilation with no significant air trapping.                               X-Ray Chest PA And Lateral (Final result)  Result time 01/04/19 13:10:38    Final result by Alber Green MD (01/04/19 13:10:38)                 Impression:      1. No acute radiographic findings in the chest.      Electronically signed by: Alber Green MD  Date:    01/04/2019  Time:    13:10             Narrative:    EXAMINATION:  XR CHEST PA AND LATERAL    CLINICAL HISTORY:  Cough    TECHNIQUE:  PA and lateral views of the chest were performed.    COMPARISON:  Radiograph 12/13/2017.    FINDINGS:  Mediastinal structures are midline.  Hilar contours are unremarkable.  Cardiac silhouette is normal in size.  Lung volumes are symmetric.  No consolidation.  No pneumothorax or pleural effusions.  No free air beneath the diaphragm.  No acute osseous abnormalities.                                 Medical Decision Making:   ED Management:  41-year-old female complaining of left side pain.  Writhing around in the bed in pain. Patient with a V/Q scan performed low probability for pulmonary embolism.  Positive D-dimer.  Room x-ray demonstrates no pneumothorax no pleural effusion or consolidation no cardiomegaly.  I believe the patient may be pain seeking.  I have discussed with this patient that I think that she has pleurisy versus musculoskeletal pain. Explained her that she could take NSAIDs for pain. Patient is now smiling reporting that she feels much better plan for discharge home.            Scribe Attestation:   Scribe #1: I performed the above scribed service and the documentation accurately describes the  services I performed. I attest to the accuracy of the note.    Attending Attestation:           Physician Attestation for Scribe:  Physician Attestation Statement for Scribe #1: I, Phuong Angel MD, reviewed documentation, as scribed by Wade Padilla in my presence, and it is both accurate and complete.                    Clinical Impression:   The primary encounter diagnosis was Pleurisy. A diagnosis of Cough was also pertinent to this visit.      Disposition:   Disposition: Discharged                        Phuong Angel MD  01/06/19 2001

## 2019-01-04 NOTE — ED TRIAGE NOTES
Pt arrived to the ED due to left sided flank pain that started about a month ago and constant SOB that has been going on for about a week. Pt reports worsened left sided flank pain upon inspiration and coughing. Pt reports going to an urgent care about 2 weeks ago for her left sided flank pain/cough and was given a steroid shot and inhaler.

## 2019-10-28 ENCOUNTER — HOSPITAL ENCOUNTER (OUTPATIENT)
Dept: RADIOLOGY | Facility: HOSPITAL | Age: 44
Discharge: HOME | End: 2019-10-28
Attending: FAMILY MEDICINE
Payer: MEDICAID

## 2019-10-28 DIAGNOSIS — R10.32 ABDOMINAL PAIN, LEFT LOWER QUADRANT: Primary | ICD-10-CM

## 2019-10-28 DIAGNOSIS — R10.32 LEFT LOWER QUADRANT PAIN: ICD-10-CM

## 2019-10-28 DIAGNOSIS — R10.32 LEFT LOWER QUADRANT PAIN: Primary | ICD-10-CM

## 2019-10-28 PROCEDURE — 74018 XR ABDOMEN AP 1 VIEW: ICD-10-PCS | Mod: 26,,, | Performed by: RADIOLOGY

## 2019-10-28 PROCEDURE — 74018 RADEX ABDOMEN 1 VIEW: CPT | Mod: TC,FY

## 2019-10-28 PROCEDURE — 74018 RADEX ABDOMEN 1 VIEW: CPT | Mod: 26,,, | Performed by: RADIOLOGY

## 2019-11-06 ENCOUNTER — LAB VISIT (OUTPATIENT)
Dept: LAB | Facility: HOSPITAL | Age: 44
End: 2019-11-06
Attending: FAMILY MEDICINE
Payer: MEDICAID

## 2019-11-06 DIAGNOSIS — R63.5 ABNORMAL WEIGHT GAIN: Primary | ICD-10-CM

## 2019-11-06 PROCEDURE — 83525 ASSAY OF INSULIN: CPT

## 2019-11-06 PROCEDURE — 36415 COLL VENOUS BLD VENIPUNCTURE: CPT

## 2019-11-07 LAB
INSULIN COLLECTION INTERVAL: NORMAL
INSULIN SERPL-ACNC: 13.3 UU/ML

## 2021-01-11 DIAGNOSIS — Z12.31 ENCOUNTER FOR SCREENING MAMMOGRAM FOR MALIGNANT NEOPLASM OF BREAST: Primary | ICD-10-CM

## 2021-01-12 ENCOUNTER — HOSPITAL ENCOUNTER (OUTPATIENT)
Dept: RADIOLOGY | Facility: HOSPITAL | Age: 46
Discharge: HOME | End: 2021-01-12
Attending: OBSTETRICS & GYNECOLOGY
Payer: MEDICAID

## 2021-01-12 VITALS — BODY MASS INDEX: 33.66 KG/M2 | HEIGHT: 63 IN | WEIGHT: 190 LBS

## 2021-01-12 DIAGNOSIS — Z12.31 ENCOUNTER FOR SCREENING MAMMOGRAM FOR MALIGNANT NEOPLASM OF BREAST: ICD-10-CM

## 2021-01-12 PROCEDURE — 77067 SCR MAMMO BI INCL CAD: CPT | Mod: TC

## 2021-01-12 PROCEDURE — 77063 MAMMO DIGITAL SCREENING BILAT WITH TOMO: ICD-10-PCS | Mod: 26,,, | Performed by: RADIOLOGY

## 2021-01-12 PROCEDURE — 77067 MAMMO DIGITAL SCREENING BILAT WITH TOMO: ICD-10-PCS | Mod: 26,,, | Performed by: RADIOLOGY

## 2021-01-12 PROCEDURE — 77067 SCR MAMMO BI INCL CAD: CPT | Mod: 26,,, | Performed by: RADIOLOGY

## 2021-01-12 PROCEDURE — 77063 BREAST TOMOSYNTHESIS BI: CPT | Mod: 26,,, | Performed by: RADIOLOGY

## 2023-02-21 ENCOUNTER — HOSPITAL ENCOUNTER (EMERGENCY)
Facility: HOSPITAL | Age: 48
Discharge: HOME | End: 2023-02-21
Attending: EMERGENCY MEDICINE
Payer: MEDICAID

## 2023-02-21 VITALS
HEART RATE: 80 BPM | HEIGHT: 63 IN | WEIGHT: 165 LBS | RESPIRATION RATE: 18 BRPM | TEMPERATURE: 99 F | DIASTOLIC BLOOD PRESSURE: 95 MMHG | OXYGEN SATURATION: 97 % | SYSTOLIC BLOOD PRESSURE: 145 MMHG | BODY MASS INDEX: 29.23 KG/M2

## 2023-02-21 DIAGNOSIS — K62.89 PROCTITIS: ICD-10-CM

## 2023-02-21 DIAGNOSIS — J98.11 ATELECTASIS: ICD-10-CM

## 2023-02-21 DIAGNOSIS — K65.4 MESENTERIC PANNICULITIS: Primary | ICD-10-CM

## 2023-02-21 DIAGNOSIS — N30.00 ACUTE CYSTITIS WITHOUT HEMATURIA: ICD-10-CM

## 2023-02-21 LAB
ALBUMIN SERPL BCP-MCNC: 3.9 G/DL (ref 3.5–5.2)
ALP SERPL-CCNC: 79 U/L (ref 55–135)
ALT SERPL W/O P-5'-P-CCNC: 19 U/L (ref 10–44)
ANION GAP SERPL CALC-SCNC: 9 MMOL/L (ref 8–16)
AST SERPL-CCNC: 21 U/L (ref 10–40)
BACTERIA #/AREA URNS HPF: ABNORMAL /HPF
BASOPHILS # BLD AUTO: 0.05 K/UL (ref 0–0.2)
BASOPHILS NFR BLD: 0.7 % (ref 0–1.9)
BILIRUB SERPL-MCNC: 0.2 MG/DL (ref 0.1–1)
BILIRUB UR QL STRIP: NEGATIVE
BUN SERPL-MCNC: 16 MG/DL (ref 6–20)
CALCIUM SERPL-MCNC: 8.9 MG/DL (ref 8.7–10.5)
CHLORIDE SERPL-SCNC: 108 MMOL/L (ref 95–110)
CLARITY UR: CLEAR
CO2 SERPL-SCNC: 24 MMOL/L (ref 23–29)
COLOR UR: YELLOW
CREAT SERPL-MCNC: 0.9 MG/DL (ref 0.5–1.4)
DIFFERENTIAL METHOD: NORMAL
EOSINOPHIL # BLD AUTO: 0.2 K/UL (ref 0–0.5)
EOSINOPHIL NFR BLD: 2.5 % (ref 0–8)
ERYTHROCYTE [DISTWIDTH] IN BLOOD BY AUTOMATED COUNT: 14.4 % (ref 11.5–14.5)
EST. GFR  (NO RACE VARIABLE): >60 ML/MIN/1.73 M^2
GLUCOSE SERPL-MCNC: 91 MG/DL (ref 70–110)
GLUCOSE UR QL STRIP: NEGATIVE
HCT VFR BLD AUTO: 41.2 % (ref 37–48.5)
HGB BLD-MCNC: 14 G/DL (ref 12–16)
HGB UR QL STRIP: NEGATIVE
IMM GRANULOCYTES # BLD AUTO: 0.01 K/UL (ref 0–0.04)
IMM GRANULOCYTES NFR BLD AUTO: 0.1 % (ref 0–0.5)
KETONES UR QL STRIP: ABNORMAL
LEUKOCYTE ESTERASE UR QL STRIP: ABNORMAL
LYMPHOCYTES # BLD AUTO: 2.6 K/UL (ref 1–4.8)
LYMPHOCYTES NFR BLD: 37.7 % (ref 18–48)
MCH RBC QN AUTO: 30.6 PG (ref 27–31)
MCHC RBC AUTO-ENTMCNC: 34 G/DL (ref 32–36)
MCV RBC AUTO: 90 FL (ref 82–98)
MICROSCOPIC COMMENT: ABNORMAL
MONOCYTES # BLD AUTO: 0.6 K/UL (ref 0.3–1)
MONOCYTES NFR BLD: 8.1 % (ref 4–15)
NEUTROPHILS # BLD AUTO: 3.5 K/UL (ref 1.8–7.7)
NEUTROPHILS NFR BLD: 50.9 % (ref 38–73)
NITRITE UR QL STRIP: NEGATIVE
NRBC BLD-RTO: 0 /100 WBC
PH UR STRIP: 7 [PH] (ref 5–8)
PLATELET # BLD AUTO: 332 K/UL (ref 150–450)
PMV BLD AUTO: 11.2 FL (ref 9.2–12.9)
POTASSIUM SERPL-SCNC: 4.6 MMOL/L (ref 3.5–5.1)
PROT SERPL-MCNC: 7.2 G/DL (ref 6–8.4)
PROT UR QL STRIP: ABNORMAL
RBC # BLD AUTO: 4.57 M/UL (ref 4–5.4)
RBC #/AREA URNS HPF: 3 /HPF (ref 0–4)
SODIUM SERPL-SCNC: 141 MMOL/L (ref 136–145)
SP GR UR STRIP: 1.03 (ref 1–1.03)
SQUAMOUS #/AREA URNS HPF: 9 /HPF
URN SPEC COLLECT METH UR: ABNORMAL
UROBILINOGEN UR STRIP-ACNC: NEGATIVE EU/DL
WBC # BLD AUTO: 6.81 K/UL (ref 3.9–12.7)
WBC #/AREA URNS HPF: 16 /HPF (ref 0–5)

## 2023-02-21 PROCEDURE — 87077 CULTURE AEROBIC IDENTIFY: CPT | Performed by: EMERGENCY MEDICINE

## 2023-02-21 PROCEDURE — 87186 SC STD MICRODIL/AGAR DIL: CPT | Performed by: EMERGENCY MEDICINE

## 2023-02-21 PROCEDURE — 87088 URINE BACTERIA CULTURE: CPT | Performed by: EMERGENCY MEDICINE

## 2023-02-21 PROCEDURE — 80053 COMPREHEN METABOLIC PANEL: CPT | Performed by: NURSE PRACTITIONER

## 2023-02-21 PROCEDURE — 25000003 PHARM REV CODE 250: Performed by: NURSE PRACTITIONER

## 2023-02-21 PROCEDURE — 96365 THER/PROPH/DIAG IV INF INIT: CPT

## 2023-02-21 PROCEDURE — 85025 COMPLETE CBC W/AUTO DIFF WBC: CPT | Performed by: NURSE PRACTITIONER

## 2023-02-21 PROCEDURE — 96366 THER/PROPH/DIAG IV INF ADDON: CPT

## 2023-02-21 PROCEDURE — 99285 EMERGENCY DEPT VISIT HI MDM: CPT | Mod: 25

## 2023-02-21 PROCEDURE — 96375 TX/PRO/DX INJ NEW DRUG ADDON: CPT

## 2023-02-21 PROCEDURE — 81000 URINALYSIS NONAUTO W/SCOPE: CPT | Performed by: EMERGENCY MEDICINE

## 2023-02-21 PROCEDURE — 94799 UNLISTED PULMONARY SVC/PX: CPT

## 2023-02-21 PROCEDURE — 87086 URINE CULTURE/COLONY COUNT: CPT | Performed by: EMERGENCY MEDICINE

## 2023-02-21 PROCEDURE — 63600175 PHARM REV CODE 636 W HCPCS: Performed by: NURSE PRACTITIONER

## 2023-02-21 RX ORDER — CIPROFLOXACIN 500 MG/1
500 TABLET ORAL 2 TIMES DAILY
Qty: 20 TABLET | Refills: 0 | Status: SHIPPED | OUTPATIENT
Start: 2023-02-21 | End: 2023-03-03

## 2023-02-21 RX ORDER — METRONIDAZOLE 500 MG/1
500 TABLET ORAL 3 TIMES DAILY
Qty: 21 TABLET | Refills: 0 | Status: SHIPPED | OUTPATIENT
Start: 2023-02-21 | End: 2023-02-28

## 2023-02-21 RX ORDER — KETOROLAC TROMETHAMINE 30 MG/ML
15 INJECTION, SOLUTION INTRAMUSCULAR; INTRAVENOUS
Status: COMPLETED | OUTPATIENT
Start: 2023-02-21 | End: 2023-02-21

## 2023-02-21 RX ORDER — CIPROFLOXACIN 2 MG/ML
400 INJECTION, SOLUTION INTRAVENOUS
Status: COMPLETED | OUTPATIENT
Start: 2023-02-21 | End: 2023-02-21

## 2023-02-21 RX ORDER — PRAMOXINE HYDROCHLORIDE HYDROCORTISONE ACETATE 100; 100 MG/10G; MG/10G
1 AEROSOL, FOAM TOPICAL 2 TIMES DAILY
Qty: 10 G | Refills: 0 | Status: SHIPPED | OUTPATIENT
Start: 2023-02-21 | End: 2023-03-15

## 2023-02-21 RX ORDER — PHENAZOPYRIDINE HYDROCHLORIDE 200 MG/1
200 TABLET, FILM COATED ORAL 3 TIMES DAILY PRN
Qty: 6 TABLET | Refills: 0 | Status: SHIPPED | OUTPATIENT
Start: 2023-02-21 | End: 2023-02-23

## 2023-02-21 RX ORDER — PHENAZOPYRIDINE HYDROCHLORIDE 100 MG/1
200 TABLET, FILM COATED ORAL
Status: COMPLETED | OUTPATIENT
Start: 2023-02-21 | End: 2023-02-21

## 2023-02-21 RX ADMIN — CIPROFLOXACIN 400 MG: 2 INJECTION, SOLUTION INTRAVENOUS at 03:02

## 2023-02-21 RX ADMIN — PHENAZOPYRIDINE 200 MG: 100 TABLET ORAL at 03:02

## 2023-02-21 RX ADMIN — SODIUM CHLORIDE 1000 ML: 9 INJECTION, SOLUTION INTRAVENOUS at 03:02

## 2023-02-21 RX ADMIN — KETOROLAC TROMETHAMINE 15 MG: 30 INJECTION, SOLUTION INTRAMUSCULAR; INTRAVENOUS at 03:02

## 2023-02-21 NOTE — ED PROVIDER NOTES
Encounter Date: 2023       History     Chief Complaint   Patient presents with    Dysuria     With hesitancy and LEFT flank pain x10 days. No tx used. Denies hematuria.      Chief complaint:  Dysuria     History of present illness:  Patient is a 47-year-old female who reports 10 days of right-sided flank pain that is intermittent that now became constant.  She reports that feels like contractions, no alleviating or aggravating factors.  The pain does not radiate is currently a 7/10 severity.  She is tried no treatments or medications for the problem.    The history is provided by the patient. No  was used.   Review of patient's allergies indicates:   Allergen Reactions    Metaxalone      Other reaction(s): Anaphylaxis    Soma [carisoprodol]      Other reaction(s): Anaphylaxis    Penicillins      Other reaction(s): Unknown     Past Medical History:   Diagnosis Date    Chronic kidney disease     NEPHROLITHIASIS    Endometriosis of pelvis     H/O colonoscopy 1/10/14    INTERNAL HEMORRHOID    History of psychiatric hospitalization     Hx of psychiatric care     Lactose intolerance     Ovarian cancer     Psychiatric problem     Therapy      Past Surgical History:   Procedure Laterality Date    APPENDECTOMY       SECTION      CHOLECYSTECTOMY      HYSTERECTOMY      OOPHORECTOMY      TUBAL LIGATION       Family History   Problem Relation Age of Onset    Mental illness Mother     Bipolar disorder Mother     Depression Mother     Heart disease Father         defibrillator    Cancer Maternal Grandmother         ovarian cancer    Breast cancer Maternal Grandmother     Bipolar disorder Paternal Grandmother     Ovarian cancer Paternal Grandmother     Breast cancer Maternal Aunt     Breast cancer Paternal Aunt     Ovarian cancer Paternal Aunt     Ovarian cancer Paternal Aunt      Social History     Tobacco Use    Smoking status: Former     Packs/day: 0.50     Years: 22.00     Pack years: 11.00      Types: Cigarettes    Smokeless tobacco: Current   Substance Use Topics    Alcohol use: No    Drug use: Yes     Comment: uses all types of drug and prescription medications     Review of Systems   Constitutional:  Negative for chills, fatigue and fever.   HENT:  Negative for congestion, ear discharge, ear pain, postnasal drip, rhinorrhea, sinus pressure, sneezing, sore throat and voice change.    Eyes:  Negative for discharge and itching.   Respiratory:  Negative for cough, shortness of breath and wheezing.    Cardiovascular:  Negative for chest pain, palpitations and leg swelling.   Gastrointestinal:  Negative for abdominal pain, constipation, diarrhea, nausea and vomiting.   Endocrine: Negative for polydipsia, polyphagia and polyuria.   Genitourinary:  Positive for dysuria, frequency and urgency. Negative for hematuria, vaginal bleeding, vaginal discharge and vaginal pain.   Musculoskeletal:  Negative for arthralgias and myalgias.   Skin:  Negative for rash and wound.   Neurological:  Negative for dizziness, seizures, syncope, weakness and numbness.   Hematological:  Negative for adenopathy. Does not bruise/bleed easily.   Psychiatric/Behavioral:  Negative for self-injury and suicidal ideas. The patient is not nervous/anxious.      Physical Exam     Initial Vitals [02/21/23 1319]   BP Pulse Resp Temp SpO2   113/76 88 17 98.2 °F (36.8 °C) 99 %      MAP       --         Physical Exam    Nursing note and vitals reviewed.  Constitutional: She appears well-developed and well-nourished.   HENT:   Head: Normocephalic and atraumatic.   Right Ear: External ear normal.   Left Ear: External ear normal.   Nose: Nose normal.   Eyes: Conjunctivae and EOM are normal. Pupils are equal, round, and reactive to light. Right eye exhibits no discharge. Left eye exhibits no discharge.   Neck:   Normal range of motion.  Abdominal: Abdomen is soft. Bowel sounds are normal. She exhibits no distension. There is no abdominal tenderness.    Musculoskeletal:         General: Normal range of motion.      Cervical back: Normal range of motion.     Neurological: She is alert and oriented to person, place, and time.   Skin: Skin is dry. Capillary refill takes less than 2 seconds.       ED Course   Procedures  Labs Reviewed   URINALYSIS, REFLEX TO URINE CULTURE - Abnormal; Notable for the following components:       Result Value    Protein, UA Trace (*)     Ketones, UA Trace (*)     Leukocytes, UA 1+ (*)     All other components within normal limits    Narrative:     Specimen Source->Urine   URINALYSIS MICROSCOPIC - Abnormal; Notable for the following components:    WBC, UA 16 (*)     All other components within normal limits    Narrative:     Specimen Source->Urine   CULTURE, URINE   CBC W/ AUTO DIFFERENTIAL   COMPREHENSIVE METABOLIC PANEL          Imaging Results              CT Renal Stone Study ABD Pelvis WO (Final result)  Result time 02/21/23 14:54:38      Final result by Juana Atkinson MD (02/21/23 14:54:38)                   Impression:      No nephrolithiasis, ureterolithiasis, hydronephrosis or hydroureter.    Mesenteric panniculitis.    Appendectomy.    Equivocal thickening of the walls of the rectum which could suggest a proctitis.  There is some mild perirectal fat stranding.  Clinical correlation suggested.      Electronically signed by: Juana Atkinson MD  Date:    02/21/2023  Time:    14:54               Narrative:    EXAMINATION:  CT RENAL STONE STUDY ABD PELVIS WO    CLINICAL HISTORY:  Flank pain, kidney stone suspected;    TECHNIQUE:  Low dose axial images, sagittal and coronal reformations were obtained from the lung bases to the pubic symphysis.  Contrast was not administered.    COMPARISON:  05/13/2018, 10/28/2019    FINDINGS:  Bibasilar subsegmental atelectasis.  The base of the heart appears normal.  The aorta is of normal caliber and tapers appropriately.    The gallbladder has been removed.  No intrahepatic or extrahepatic  biliary ductal dilatation is identified.  The liver, spleen, pancreas, adrenal glands and kidneys are normal in size, shape and contour.  No nephrolithiasis, ureterolithiasis, hydronephrosis or hydroureter is seen.  The urinary bladder is partially distended and appears normal.  The uterus has been removed.  Adnexal regions are unremarkable.    Mesenteric panniculitis.  The appendix has been removed.  Questionable thickening of the walls of the rectum.  There is stranding of the fat adjacent to the rectum.  No free air, free fluid or obstruction.  No pathologically enlarged abdominal or pelvic lymph nodes are seen.    Age-appropriate degenerative changes affect the skeleton.                                       Medications   phenazopyridine tablet 200 mg (200 mg Oral Given 2/21/23 1507)   ciprofloxacin (CIPRO)400mg/200ml D5W IVPB 400 mg (0 mg Intravenous Stopped 2/21/23 1726)   sodium chloride 0.9% bolus 1,000 mL 1,000 mL (0 mLs Intravenous Stopped 2/21/23 1726)   ketorolac injection 15 mg (15 mg Intravenous Given 2/21/23 1508)           APC / Resident Notes:   MDM   Initial assessment:  Patient is a 47-year-old female who presents the emergency department complaining of dysuria frequency urgency and right flank pain.  She denies fever and abdominal pain.  On physical exam abdomen is soft and nontender x4 quadrants there is no CVA tenderness.  There is no signs of dehydration.  The patient is in no apparent distress.  She is hypertensive but otherwise vital signs are stable and reassuring.    Differential diagnosis includes UTI, pyelonephritis, infected renal stone    Plan:  CT scan renal stone protocol, CBC chemistry, IV fluids, Toradol, urinalysis    Results of all diagnostics are summarized below in ED course     Discharge planning includes incentive spirometry, ciprofloxacin,  Pyridium for UTI, Proctofoam for proctitis and Flagyl.  I discussed this patient with Dr. Pritchard who agrees with my diagnostics and  plan she is discharged to follow-up with GI and colorectal surgery.      ED Course as of 02/21/23 1947   Tue Feb 21, 2023   1359 Urinalysis, Reflex to Urine Culture Urine, Clean Catch(!)  Pos for uti will treat and culture.  Culture pending at time of disposition. [VC]   1513 CBC auto differential  Normal cbc. [VC]   1522 CT Renal Stone Study ABD Pelvis WO  No nephrolithiasis, ureterolithiasis, hydronephrosis or hydroureter.     Mesenteric panniculitis.     Appendectomy.     Equivocal thickening of the walls of the rectum which could suggest a proctitis.  There is some mild perirectal fat stranding.  Clinical correlation suggested. [VC]   1526 Comprehensive metabolic panel  Normal cmp. [VC]      ED Course User Index  [VC] Ajay Hurtado DNP                 Clinical Impression:   Final diagnoses:  [K65.4] Mesenteric panniculitis (Primary)  [N30.00] Acute cystitis without hematuria  [K62.89] Proctitis  [J98.11] Atelectasis        ED Disposition Condition    Discharge Stable          ED Prescriptions       Medication Sig Dispense Start Date End Date Auth. Provider    ciprofloxacin HCl (CIPRO) 500 MG tablet Take 1 tablet (500 mg total) by mouth 2 (two) times daily. for 10 days 20 tablet 2/21/2023 3/3/2023 Ajay Hurtado DNP    phenazopyridine (PYRIDIUM) 200 MG tablet Take 1 tablet (200 mg total) by mouth 3 (three) times daily as needed for Pain. 6 tablet 2/21/2023 2/23/2023 Ajay Hurtado DNP    hydrocortisone-pramoxine (PROCTOFOAM HC) rectal foam Place 1 applicator rectally 2 (two) times daily. 10 g 2/21/2023 -- Ajay Hurtado DNP    metroNIDAZOLE (FLAGYL) 500 MG tablet Take 1 tablet (500 mg total) by mouth 3 (three) times daily. for 7 days 21 tablet 2/21/2023 2/28/2023 Ajay Hurtado DNP          Follow-up Information       Follow up With Specialties Details Why Contact Info    [facility]-All Locations Gastroenterology Schedule an appointment as soon as possible  for a visit   [address] [place] [address] [address]  [address]  [place] [address]  [phone]      [facility] Colon and Rectal Surgery Schedule an appointment as soon as possible for a visit   [facility] [address]  [phone]             Ajay Hurtado, KEIKO  02/21/23 1947

## 2023-02-21 NOTE — DISCHARGE INSTRUCTIONS
Take antibiotics as ordered. No alcohol with flagyl (metronidazole). Pyridium will cause orange colored urine, consider a panty liner to protect clothing. Return to the Emergency Department for any worsening, change in condition, or any emergent concerns. Incentive spirometry every 15min for 2-3d.

## 2023-02-23 LAB — BACTERIA UR CULT: ABNORMAL

## 2023-03-04 ENCOUNTER — HOSPITAL ENCOUNTER (EMERGENCY)
Facility: HOSPITAL | Age: 48
Discharge: HOME | End: 2023-03-04
Attending: EMERGENCY MEDICINE
Payer: MEDICAID

## 2023-03-04 VITALS
WEIGHT: 186 LBS | TEMPERATURE: 98 F | SYSTOLIC BLOOD PRESSURE: 118 MMHG | DIASTOLIC BLOOD PRESSURE: 73 MMHG | RESPIRATION RATE: 19 BRPM | BODY MASS INDEX: 32.96 KG/M2 | HEIGHT: 63 IN | HEART RATE: 79 BPM | OXYGEN SATURATION: 94 %

## 2023-03-04 DIAGNOSIS — S62.308A: ICD-10-CM

## 2023-03-04 DIAGNOSIS — R55 SYNCOPE: ICD-10-CM
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
## 2023-03-04 DIAGNOSIS — S62.303A CLOSED NONDISPLACED FRACTURE OF THIRD METACARPAL BONE OF LEFT HAND, UNSPECIFIED PORTION OF METACARPAL, INITIAL ENCOUNTER: ICD-10-CM

## 2023-03-04 DIAGNOSIS — S62.317A CLOSED DISPLACED FRACTURE OF BASE OF FIFTH METACARPAL BONE OF LEFT HAND, INITIAL ENCOUNTER: Primary | ICD-10-CM

## 2023-03-04 LAB
ALBUMIN SERPL BCP-MCNC: 4.1 G/DL (ref 3.5–5.2)
ALP SERPL-CCNC: 82 U/L (ref 55–135)
ALT SERPL W/O P-5'-P-CCNC: 46 U/L (ref 10–44)
ANION GAP SERPL CALC-SCNC: 13 MMOL/L (ref 8–16)
AST SERPL-CCNC: 45 U/L (ref 10–40)
BACTERIA #/AREA URNS HPF: NORMAL /HPF
BASOPHILS # BLD AUTO: 0.06 K/UL (ref 0–0.2)
BASOPHILS NFR BLD: 0.7 % (ref 0–1.9)
BILIRUB SERPL-MCNC: 0.4 MG/DL (ref 0.1–1)
BILIRUB UR QL STRIP: NEGATIVE
BUN SERPL-MCNC: 20 MG/DL (ref 6–20)
CALCIUM SERPL-MCNC: 9.7 MG/DL (ref 8.7–10.5)
CHLORIDE SERPL-SCNC: 104 MMOL/L (ref 95–110)
CLARITY UR: CLEAR
CO2 SERPL-SCNC: 20 MMOL/L (ref 23–29)
COLOR UR: YELLOW
CREAT SERPL-MCNC: 1.3 MG/DL (ref 0.5–1.4)
DIFFERENTIAL METHOD: ABNORMAL
EOSINOPHIL # BLD AUTO: 0.1 K/UL (ref 0–0.5)
EOSINOPHIL NFR BLD: 1.1 % (ref 0–8)
ERYTHROCYTE [DISTWIDTH] IN BLOOD BY AUTOMATED COUNT: 14.8 % (ref 11.5–14.5)
EST. GFR  (NO RACE VARIABLE): 51 ML/MIN/1.73 M^2
GLUCOSE SERPL-MCNC: 117 MG/DL (ref 70–110)
GLUCOSE UR QL STRIP: NEGATIVE
HCT VFR BLD AUTO: 46.6 % (ref 37–48.5)
HGB BLD-MCNC: 15.4 G/DL (ref 12–16)
HGB UR QL STRIP: NEGATIVE
HYALINE CASTS #/AREA URNS LPF: 0 /LPF
IMM GRANULOCYTES # BLD AUTO: 0.01 K/UL (ref 0–0.04)
IMM GRANULOCYTES NFR BLD AUTO: 0.1 % (ref 0–0.5)
KETONES UR QL STRIP: ABNORMAL
LEUKOCYTE ESTERASE UR QL STRIP: NEGATIVE
LYMPHOCYTES # BLD AUTO: 1.9 K/UL (ref 1–4.8)
LYMPHOCYTES NFR BLD: 22.6 % (ref 18–48)
MAGNESIUM SERPL-MCNC: 1.9 MG/DL (ref 1.6–2.6)
MCH RBC QN AUTO: 30.3 PG (ref 27–31)
MCHC RBC AUTO-ENTMCNC: 33 G/DL (ref 32–36)
MCV RBC AUTO: 92 FL (ref 82–98)
MICROSCOPIC COMMENT: NORMAL
MONOCYTES # BLD AUTO: 0.6 K/UL (ref 0.3–1)
MONOCYTES NFR BLD: 7 % (ref 4–15)
NEUTROPHILS # BLD AUTO: 5.9 K/UL (ref 1.8–7.7)
NEUTROPHILS NFR BLD: 68.5 % (ref 38–73)
NITRITE UR QL STRIP: NEGATIVE
NRBC BLD-RTO: 0 /100 WBC
PH UR STRIP: 6 [PH] (ref 5–8)
PLATELET # BLD AUTO: 342 K/UL (ref 150–450)
PMV BLD AUTO: 11.1 FL (ref 9.2–12.9)
POTASSIUM SERPL-SCNC: 5.2 MMOL/L (ref 3.5–5.1)
PROT SERPL-MCNC: 8 G/DL (ref 6–8.4)
PROT UR QL STRIP: ABNORMAL
RBC # BLD AUTO: 5.08 M/UL (ref 4–5.4)
RBC #/AREA URNS HPF: 3 /HPF (ref 0–4)
SODIUM SERPL-SCNC: 137 MMOL/L (ref 136–145)
SP GR UR STRIP: >1.03 (ref 1–1.03)
SQUAMOUS #/AREA URNS HPF: 5 /HPF
URN SPEC COLLECT METH UR: ABNORMAL
UROBILINOGEN UR STRIP-ACNC: NEGATIVE EU/DL
WBC # BLD AUTO: 8.54 K/UL (ref 3.9–12.7)
WBC #/AREA URNS HPF: 2 /HPF (ref 0–5)

## 2023-03-04 PROCEDURE — 25000003 PHARM REV CODE 250: Performed by: EMERGENCY MEDICINE

## 2023-03-04 PROCEDURE — 29125 APPL SHORT ARM SPLINT STATIC: CPT | Mod: LT

## 2023-03-04 PROCEDURE — 90471 IMMUNIZATION ADMIN: CPT | Performed by: EMERGENCY MEDICINE

## 2023-03-04 PROCEDURE — 81000 URINALYSIS NONAUTO W/SCOPE: CPT | Performed by: EMERGENCY MEDICINE

## 2023-03-04 PROCEDURE — 80053 COMPREHEN METABOLIC PANEL: CPT | Performed by: EMERGENCY MEDICINE

## 2023-03-04 PROCEDURE — 96360 HYDRATION IV INFUSION INIT: CPT

## 2023-03-04 PROCEDURE — 83735 ASSAY OF MAGNESIUM: CPT | Performed by: EMERGENCY MEDICINE

## 2023-03-04 PROCEDURE — 85025 COMPLETE CBC W/AUTO DIFF WBC: CPT | Performed by: EMERGENCY MEDICINE

## 2023-03-04 PROCEDURE — 93010 ELECTROCARDIOGRAM REPORT: CPT | Mod: ,,, | Performed by: INTERNAL MEDICINE

## 2023-03-04 PROCEDURE — 99285 EMERGENCY DEPT VISIT HI MDM: CPT | Mod: 25

## 2023-03-04 PROCEDURE — 90715 TDAP VACCINE 7 YRS/> IM: CPT | Performed by: EMERGENCY MEDICINE

## 2023-03-04 PROCEDURE — 93010 EKG 12-LEAD: ICD-10-PCS | Mod: ,,, | Performed by: INTERNAL MEDICINE

## 2023-03-04 PROCEDURE — 63600175 PHARM REV CODE 636 W HCPCS: Performed by: EMERGENCY MEDICINE

## 2023-03-04 PROCEDURE — 93005 ELECTROCARDIOGRAM TRACING: CPT | Mod: 59

## 2023-03-04 RX ORDER — HYDROCODONE BITARTRATE AND ACETAMINOPHEN 5; 325 MG/1; MG/1
1 TABLET ORAL
Status: COMPLETED | OUTPATIENT
Start: 2023-03-04 | End: 2023-03-04

## 2023-03-04 RX ORDER — HYDROCODONE BITARTRATE AND ACETAMINOPHEN 5; 325 MG/1; MG/1
1 TABLET ORAL EVERY 6 HOURS PRN
Qty: 12 TABLET | Refills: 0 | Status: SHIPPED | OUTPATIENT
Start: 2023-03-04 | End: 2023-03-15

## 2023-03-04 RX ADMIN — HYDROCODONE BITARTRATE AND ACETAMINOPHEN 1 TABLET: 5; 325 TABLET ORAL at 09:03

## 2023-03-04 RX ADMIN — SODIUM CHLORIDE 1000 ML: 9 INJECTION, SOLUTION INTRAVENOUS at 09:03

## 2023-03-04 RX ADMIN — TETANUS TOXOID, REDUCED DIPHTHERIA TOXOID AND ACELLULAR PERTUSSIS VACCINE, ADSORBED 0.5 ML: 5; 2.5; 8; 8; 2.5 SUSPENSION INTRAMUSCULAR at 09:03

## 2023-03-04 NOTE — ED PROVIDER NOTES
"Encounter Date: 3/4/2023    SCRIBE #1 NOTE: I, Margie Storey, am scribing for, and in the presence of,  Loyda Corona MD. I have scribed the following portions of the note - Other sections scribed: HPI, ROS, PE.     History     Chief Complaint   Patient presents with    Fall     Patient reports was sitting out side smoking, when stood up fell x 2 yesterday, fell face first hit head, abrasions noted to chin, unsure if LOC spouse states she was out of it for a few seconds. Patient with LROM to right shoulder, swelling to right wrist and hand noted, + pulse.      CC: left wrist pain    HPI: This is a 47 y.o.female patient, who denies past medical history, presenting to the ED for further evaluation of left wrist pain with associated swelling s/p multiple syncopal episodes yesterday. Patient states she was smoking marijuana outside and fell when she stood up, Patient states she fell face first and hit her face the first time she fell. Per patient's spouse, patient was unconscious for 2 seconds and woke up confused for "15 seconds." She had another episode after standing later in the evening. Patient states, " I don't remember standing up." Patient reports associated symptoms of facial trauma (abrasions to chin), left shoulder soreness, and left arm pain. Patient's dominant hand is her right hand. Patient also reports she is on Cipro and Flagyl and has been having multiple episodes of diarrhea. Patient denies being on any blood thinners. Patient denies any fever, chills, shortness of breath, chest pain, neck pain, nausea, vomiting, or any other associated symptoms. No prior Tx. No alleviating or aggravating factors. Patient is allergic to Penicillin, Metaxalone, and Soma.    The history is provided by the patient and the spouse. No  was used.   Review of patient's allergies indicates:   Allergen Reactions    Metaxalone      Other reaction(s): Anaphylaxis    Soma [carisoprodol]      Other " reaction(s): Anaphylaxis    Penicillins      Other reaction(s): Unknown     Past Medical History:   Diagnosis Date    Chronic kidney disease     NEPHROLITHIASIS    Endometriosis of pelvis     H/O colonoscopy 1/10/14    INTERNAL HEMORRHOID    History of psychiatric hospitalization     Hx of psychiatric care     Lactose intolerance     Ovarian cancer     Psychiatric problem     Therapy      Past Surgical History:   Procedure Laterality Date    APPENDECTOMY       SECTION      CHOLECYSTECTOMY      HYSTERECTOMY      OOPHORECTOMY      TUBAL LIGATION       Family History   Problem Relation Age of Onset    Mental illness Mother     Bipolar disorder Mother     Depression Mother     Heart disease Father         defibrillator    Cancer Maternal Grandmother         ovarian cancer    Breast cancer Maternal Grandmother     Bipolar disorder Paternal Grandmother     Ovarian cancer Paternal Grandmother     Breast cancer Maternal Aunt     Breast cancer Paternal Aunt     Ovarian cancer Paternal Aunt     Ovarian cancer Paternal Aunt      Social History     Tobacco Use    Smoking status: Former     Packs/day: 0.50     Years: 22.00     Pack years: 11.00     Types: Cigarettes    Smokeless tobacco: Current   Substance Use Topics    Alcohol use: No    Drug use: Yes     Comment: uses all types of drug and prescription medications     Review of Systems   Constitutional:  Negative for chills and fever.   HENT:  Negative for facial swelling.    Eyes:  Negative for visual disturbance.   Respiratory:  Negative for cough and shortness of breath.    Cardiovascular:  Negative for chest pain.   Gastrointestinal:  Positive for diarrhea. Negative for abdominal pain, nausea and vomiting.   Genitourinary:  Negative for dysuria.   Musculoskeletal:  Positive for arthralgias (left wrist, and left shoulder) and joint swelling (left wrist). Negative for back pain and neck pain.   Skin:  Positive for wound (abrasions to chin).   Neurological:  Positive  for syncope. Negative for weakness, numbness and headaches.   Psychiatric/Behavioral:  Negative for decreased concentration.      Physical Exam     Initial Vitals [03/04/23 0824]   BP Pulse Resp Temp SpO2   106/70 98 20 97.9 °F (36.6 °C) 95 %      MAP       --         Physical Exam    Nursing note and vitals reviewed.  Constitutional: She appears well-developed and well-nourished. She is not diaphoretic. No distress.   HENT:   Mouth/Throat: Oropharynx is clear and moist.   Cracked upper right sided premolar, superficial abrasion to chin, no trismus or facial swelling, no epistaxis or nasal deformity. No Racoons eyes or Vivas sign.    Eyes: Conjunctivae and EOM are normal. Pupils are equal, round, and reactive to light.   Neck: Neck supple.   No posterior neck tenderness   Cardiovascular:  Normal rate and regular rhythm.           Pulses:       Radial pulses are 2+ on the right side and 2+ on the left side.   Pulmonary/Chest: Breath sounds normal.   Abdominal: Abdomen is soft. There is no abdominal tenderness.   Musculoskeletal:      Left wrist: Swelling present.      Cervical back: Neck supple.      Comments: Bruising and swelling to left wrist and left hand. Sensation to light touch in all 4 extremities is intact.     Neurological: She is alert and oriented to person, place, and time. GCS score is 15. GCS eye subscore is 4. GCS verbal subscore is 5. GCS motor subscore is 6.   Skin: Skin is warm and dry. Abrasion (chin) noted.   Psychiatric: She has a normal mood and affect.       ED Course   Splint Application    Date/Time: 3/4/2023 11:14 AM  Performed by: Loyda Corona MD  Authorized by: Loyda Corona MD   Location details: left hand  Splint type: ulnar gutter  Supplies used: Ortho-Glass  Post-procedure: The splinted body part was neurovascularly unchanged following the procedure.  Patient tolerance: Patient tolerated the procedure well with no immediate complications      Labs Reviewed   CBC W/ AUTO  DIFFERENTIAL - Abnormal; Notable for the following components:       Result Value    RDW 14.8 (*)     All other components within normal limits   COMPREHENSIVE METABOLIC PANEL - Abnormal; Notable for the following components:    Potassium 5.2 (*)     CO2 20 (*)     Glucose 117 (*)     AST 45 (*)     ALT 46 (*)     eGFR 51 (*)     All other components within normal limits   URINALYSIS, REFLEX TO URINE CULTURE - Abnormal; Notable for the following components:    Specific Gravity, UA >1.030 (*)     Protein, UA 1+ (*)     Ketones, UA Trace (*)     All other components within normal limits    Narrative:     Specimen Source->Urine   MAGNESIUM   URINALYSIS MICROSCOPIC    Narrative:     Specimen Source->Urine     EKG Readings: (Independently Interpreted)   Normal sinus rhythm, rate 90 beats per minute, normal MD interval,  milliseconds, no STEMI.     Imaging Results              CT Head Without Contrast (Final result)  Result time 03/04/23 09:48:50      Final result by Robert Momin MD (03/04/23 09:48:50)                   Impression:      No acute intracranial abnormality.      Electronically signed by: Robert Momin MD  Date:    03/04/2023  Time:    09:48               Narrative:    EXAMINATION:  CT HEAD WITHOUT CONTRAST    CLINICAL HISTORY:  Head trauma, moderate-severe;Facial trauma, blunt;    TECHNIQUE:  Low dose axial images were obtained through the head.  Coronal and sagittal reformations were also performed. Contrast was not administered.    FINDINGS:  The brain has a normal appearance.  The ventricular system is within normal limits of size for age and shows no distortion by mass effect.  There is no intra or extra-axial mass or hemorrhage.  The visualized extracranial structures are unremarkable.                                       CT Maxillofacial Without Contrast (Final result)  Result time 03/04/23 09:56:40      Final result by Robert Momin MD (03/04/23 09:56:40)                   Impression:      No  evidence of fracture.      Electronically signed by: Robert Momin MD  Date:    03/04/2023  Time:    09:56               Narrative:    EXAMINATION:  CT MAXILLOFACIAL WITHOUT CONTRAST    CLINICAL HISTORY:  Facial trauma, blunt;    TECHNIQUE:  Low dose axial images, sagittal and coronal reformations were obtained through the face.  Contrast was not administered.    FINDINGS:  The visualized intracranial content is unremarkable.  The mastoid air cells are well aerated.  There is mild bilateral maxillary mucous membrane thickening.  There is no fracture, dislocation, or bony erosion.                                       X-Ray Hand 3 View Left (In process)  Result time 03/04/23 09:35:01                     X-Ray Wrist Complete Left (Final result)  Result time 03/04/23 09:36:18      Final result by Robert Momin MD (03/04/23 09:36:18)                   Impression:      As above.      Electronically signed by: Robert Momin MD  Date:    03/04/2023  Time:    09:36               Narrative:    EXAMINATION:  XR WRIST COMPLETE 3 VIEWS LEFT    CLINICAL HISTORY:  Unspecified fall, initial encounter    TECHNIQUE:  PA, lateral, and oblique views of the left wrist were performed.    FINDINGS:  There are comminuted displaced fractures identified involving the distal 4th metacarpal and at the base of the 5th metacarpal. There is a mildly displaced fracture identified at the base of the 3rd metacarpal and possibly at the base of the 4th metacarpal.                                       X-Ray Cervical Spine AP And Lateral (Final result)  Result time 03/04/23 09:37:55      Final result by Robert Momin MD (03/04/23 09:37:55)                   Impression:      As above.      Electronically signed by: Robert Momin MD  Date:    03/04/2023  Time:    09:37               Narrative:    EXAMINATION:  XR CERVICAL SPINE AP LATERAL    CLINICAL HISTORY:  Unspecified fall, initial encounter    TECHNIQUE:  AP, lateral and open mouth views of the  cervical spine were performed.    FINDINGS:  The cervical spinal alignment and vertebral body heights are satisfactorily preserved.  There is scattered degenerative change.  There is no prevertebral soft tissue swelling.  There is no fracture, dislocation, or bony erosion.                                       Medications   sodium chloride 0.9% bolus 1,000 mL 1,000 mL (0 mLs Intravenous Stopped 3/4/23 1033)   Tdap (BOOSTRIX) vaccine injection 0.5 mL (0.5 mLs Intramuscular Given 3/4/23 0904)   HYDROcodone-acetaminophen 5-325 mg per tablet 1 tablet (1 tablet Oral Given 3/4/23 0914)     Medical Decision Making:   Initial Assessment:   47-year-old female with no past medical history presents after syncopal episode yesterday with facial trauma.  Patient reports being on Cipro and Flagyl for an infection, she is had diarrhea.  She states yesterday after standing up, she passed out.  This happened at least 2 times.  She states that she was otherwise in her usual state of health.  The 1st time she passed out, she fell face 1st.  She does not recall breaking her fall.  On presentation today, she complains of some left wrist pain and swelling, some soreness in her left shoulder.  On exam, the patient's blood pressure is 106/70.  She is afebrile.  She is alert, GCS of 15.  She is a superficial abrasion to her chin, she has a fractured right upper premolar.  No raccoon's eyes or childs sign.  She has no posterior neck tenderness.  There is no bony tenderness of the left shoulder or left elbow.  She has swelling and ecchymosis to the left wrist.  She is neurovascularly intact.  Differential includes not limited to orthostasis, electrolyte disturbance, symptomatic anemia, lower suspicion for dysrhythmia.  Traumatic considerations include TBI, concussion, facial fracture, fracture of the left wrist.  Will treat with p.o. analgesia and IV fluids.  Independently Interpreted Test(s):   I have ordered and independently interpreted EKG  Reading(s) - see prior notes  Clinical Tests:   Lab Tests: Reviewed and Ordered  Radiological Study: Reviewed and Ordered  Medical Tests: Ordered and Reviewed  ED Management:  Patient denies complaints on reassessment.  Unclear etiology of her syncopal episode yesterday although I suspect orthostasis in the setting of diarrhea.  The patient was treated with IV fluids here today.  Sensation to light touch is intact in fingers of left hand, cap refill 2 seconds after splint application.  Patient has been advised to follow up with Orthopedic surgery on Monday.  Will provide information for Clinic.  Will discharge with prescription for Norco, reviewed narcotic prescription precautions with the patient.  I have also advised her to follow-up with her primary care physician regarding syncopal episodes.        Scribe Attestation:   Scribe #1: I performed the above scribed service and the documentation accurately describes the services I performed. I attest to the accuracy of the note.      ED Course as of 03/04/23 1117   Sat Mar 04, 2023   1018 Case reviewed with Dr. Silva, orthopedics, who recommends placing the patient in an ulnar gutter splint, he will see patient in clinic on Monday at 1:00 p.m.. [LH]      ED Course User Index  [LH] Loyda Corona MD                 Clinical Impression:   Final diagnoses:  [R55] Syncope  [W19.XXXA] Fall  [S62.317A] Closed displaced fracture of base of fifth metacarpal bone of left hand, initial encounter (Primary)  [S62.303A] Closed nondisplaced fracture of third metacarpal bone of left hand, unspecified portion of metacarpal, initial encounter  [S62.308A] Closed nondisplaced fracture of fourth metacarpal bone, unspecified laterality, unspecified portion of metacarpal, initial encounter        ED Disposition Condition    Discharge Stable          ED Prescriptions       Medication Sig Dispense Start Date End Date Auth. Provider    HYDROcodone-acetaminophen (NORCO) 5-325 mg per tablet  Take 1 tablet by mouth every 6 (six) hours as needed for Pain. 12 tablet 3/4/2023 -- Loyda Corona MD          Follow-up Information       Follow up With Specialties Details Why Contact Info    Christopher Silva MD Orthopedic Surgery On 3/6/2023 at 1 pm [id number]  [address]  [facility] [address]  [phone]      [facility] - Emergency Dept Emergency Medicine  As needed, If symptoms worsen [address]  [facility] [id number] [phone]    Andrés Steve MD Family Medicine Schedule an appointment as soon as possible for a visit   [address]  [facility]  [place] [address]  [phone]              I, Loyda Corona MD, personally performed the services described in this documentation. All medical record entries made by the scribe were at my direction and in my presence. I have reviewed the chart and agree that the record reflects my personal performance and is accurate and complete.    This dictation has been generated using M-Modal Fluency Direct dictation; some phonetic errors may occur.          Loyda Corona MD  03/04/23 4226

## 2023-03-04 NOTE — ED TRIAGE NOTES
Fall (Patient reports was sitting out side smoking marijuana, when stood up fell yesterday, fell face first hit head, abrasions noted to chin, unsure if LOC spouse states she was out of it for a few seconds. Patient with LROM to right shoulder, swelling to right wrist and hand noted, + pulse but diminished. )

## 2023-03-08 ENCOUNTER — PATIENT OUTREACH (OUTPATIENT)
Dept: EMERGENCY MEDICINE | Facility: HOSPITAL | Age: 48
End: 2023-03-08
Payer: MEDICAID

## 2023-03-08 NOTE — PROGRESS NOTES
Sharyn Sherman  ED Navigator  Emergency Department    [place] [facility] [place]  Role: Community Health Worker    Date: 03/08/2023  Patient Name: Miriam Reyes  MRN: [id number]  PCP: Andrés Steve MD    Assessment:     Miriam Reyes is a 47 y.o. female who has presented to ED for fall. Patient has visited the ED 2 times in the past 3 months. Patient did contact PCP.     ED Navigator Initial Assessment    ED Navigator Enrollment Documentation  Consent to Services  Does patient consent to completing the assessment?: Yes  Contact  Method of Initial Contact: Phone  Transportation  Does the patient have issues with Transportation?: No  Does the patient have transportation to and from healthcare appointments?: Yes  Insurance Coverage  Do you have coverage/adequate coverage?: Yes  Type/kind of coverage: Coverage:  Medicaid/c Community Plan [facility] ([place] Medicaid)  Is patient able to afford co-pays/deductibles?: Yes  Is patient able to afford HME or supplies?: No  Does patient have an established [facility] PCP?: Yes  Able to access?: Yes  Does the patient have a lack of adequate coverage?: No  Specialist Appointment  Did the patient come to the ED to see a specialist?: No  Does the patient have a pending specialist referral?: Yes  Does the patient have a specialist appointment made?: No  PCP Follow Up Appointment  Has the patient had an appointment with a primary care provider in the past year?: Yes  Approximate date: 3/7/23  Provider: Andrés Steve MD  Does the patient have a follow up appontment with a PCP?: No  When was the last time you saw your PCP?: 3/7/23  Why does the patient not have a follow up scheduled?: Other (see comments) (Comment: Patient followed up)  Medications  Is patient able to afford medication?: Yes  Is patient unable to get medication due to lack of transportation?: No  Psychological  Does the patient have psycho-social concerns?: Yes  What concerns does the patient have?: Anxiety and/or  Depression  Food  Does the patient have concerns about food?: No  Communication/Education  Does the patient have limited English proficiency/English not primary language?: No  Does patient have low literacy and/or low health literacy?: Yes  Does patient have concerns with care?: No  Does patient have dissatisfaction with care?: No  Other Financial Concerns  Does the patient have immediate financial distress?: No  Does the patient have general financial concerns?: No  Other Social Barriers/Concerns  Does the patient have any additional barriers or concerns?: Work  Primary Barrier  Barriers identified: Cognitive barrier (health literacy, language and communication, etc.)  Root Cause of ED Utilization: Patient Knowledge/Low Health Literacy  Plan to address Patient Knowledge/Low Health Literacy: Provided information for [facility] 24/7 Nurse triage line [phone] or 1-866-[facility] ([phone])  Next steps: Provided Education  Was education/educational materials provided surrounding PCP services/creating a medical home?: Yes Was education verbal or written?: Verbal     Was education/educational materials provided surrounding low cost, healthy foods?: Yes Was education verbal or written?: Written     Was education/educational materials provided surrounding other items? If so, use comment to explain.: Yes Was education verbal or written?: Written   Plan: Provided information for [facility] 24/7 Nurse triage line, [phone] or 1-866-[facility] ([phone])  Expected Date of Follow Up 1: 3/15/23  Additional Documentation: ED Navigator spoke with patient regarding her ED visit. Patient stated she had better days. Patient had a follow-up with her PCP yesterday. Patient requested assistance scheduling an appointment with Orthopedics. Assessment completed. Patient declined the need for resources at this time. ED Navigator to assist patient with finding an Orthopedic MD.  Sharyn Sherman          Social History      Socioeconomic History    Marital status:     Number of children: 3   Occupational History    Occupation: none   Tobacco Use    Smoking status: Former     Packs/day: 0.50     Years: 22.00     Pack years: 11.00     Types: Cigarettes    Smokeless tobacco: Current   Substance and Sexual Activity    Alcohol use: No    Drug use: Yes     Comment: uses all types of drug and prescription medications    Sexual activity: Yes     Partners: Male     Birth control/protection: None, See Surgical Hx     Social Determinants of Health     Financial Resource Strain: Low Risk     Difficulty of Paying Living Expenses: Not hard at all   Food Insecurity: Unknown    Ran Out of Food in the Last Year: Never true   Transportation Needs: No Transportation Needs    Lack of Transportation (Medical): No    Lack of Transportation (Non-Medical): No   Physical Activity: Inactive    Days of Exercise per Week: 0 days    Minutes of Exercise per Session: 0 min   Stress: No Stress Concern Present    Feeling of Stress : Not at all   Social Connections: Unknown    Frequency of Communication with Friends and Family: More than three times a week    Frequency of Social Gatherings with Friends and Family: More than three times a week    Attends Scientology Services: Patient refused    Active Member of Clubs or Organizations: Patient refused    Attends Club or Organization Meetings: Patient refused    Marital Status:    Housing Stability: Unknown    Unable to Pay for Housing in the Last Year: No    Number of Places Lived in the Last Year: 1       Plan:   ED Navigator spoke with patient regarding her ED visit. Patient stated she had better days. Patient had a follow-up with her PCP yesterday. Patient requested assistance scheduling an appointment with Orthopedics. Assessment completed. Patient declined the need for resources at this time. ED Navigator to assist patient with finding an Orthopedic MD.  Sharyn Sherman       Appointment made with:  Andrés Steve MD

## 2023-03-09 ENCOUNTER — HOSPITAL ENCOUNTER (EMERGENCY)
Facility: HOSPITAL | Age: 48
Discharge: HOME | End: 2023-03-09
Attending: EMERGENCY MEDICINE
Payer: MEDICAID

## 2023-03-09 VITALS
DIASTOLIC BLOOD PRESSURE: 79 MMHG | HEART RATE: 88 BPM | BODY MASS INDEX: 32.59 KG/M2 | TEMPERATURE: 98 F | SYSTOLIC BLOOD PRESSURE: 119 MMHG | RESPIRATION RATE: 18 BRPM | WEIGHT: 184 LBS | OXYGEN SATURATION: 97 %

## 2023-03-09 DIAGNOSIS — S62.313D CLOSED DISPLACED FRACTURE OF BASE OF THIRD METACARPAL BONE OF LEFT HAND WITH ROUTINE HEALING, SUBSEQUENT ENCOUNTER: Primary | ICD-10-CM

## 2023-03-09 DIAGNOSIS — S62.315A CLOSED DISPLACED FRACTURE OF BASE OF FOURTH METACARPAL BONE OF LEFT HAND, INITIAL ENCOUNTER: ICD-10-CM

## 2023-03-09 DIAGNOSIS — S62.317A CLOSED DISPLACED FRACTURE OF BASE OF FIFTH METACARPAL BONE OF LEFT HAND, INITIAL ENCOUNTER: ICD-10-CM

## 2023-03-09 PROCEDURE — 63600175 PHARM REV CODE 636 W HCPCS: Performed by: PHYSICIAN ASSISTANT

## 2023-03-09 PROCEDURE — 99284 EMERGENCY DEPT VISIT MOD MDM: CPT

## 2023-03-09 PROCEDURE — 96372 THER/PROPH/DIAG INJ SC/IM: CPT | Performed by: PHYSICIAN ASSISTANT

## 2023-03-09 PROCEDURE — 29125 APPL SHORT ARM SPLINT STATIC: CPT | Mod: LT

## 2023-03-09 RX ORDER — IBUPROFEN 600 MG/1
600 TABLET ORAL EVERY 6 HOURS PRN
Qty: 30 TABLET | Refills: 0 | Status: SHIPPED | OUTPATIENT
Start: 2023-03-09 | End: 2023-03-15

## 2023-03-09 RX ORDER — KETOROLAC TROMETHAMINE 30 MG/ML
30 INJECTION, SOLUTION INTRAMUSCULAR; INTRAVENOUS
Status: COMPLETED | OUTPATIENT
Start: 2023-03-09 | End: 2023-03-09

## 2023-03-09 RX ORDER — MORPHINE SULFATE 4 MG/ML
6 INJECTION, SOLUTION INTRAMUSCULAR; INTRAVENOUS
Status: COMPLETED | OUTPATIENT
Start: 2023-03-09 | End: 2023-03-09

## 2023-03-09 RX ADMIN — KETOROLAC TROMETHAMINE 30 MG: 30 INJECTION, SOLUTION INTRAMUSCULAR; INTRAVENOUS at 07:03

## 2023-03-09 RX ADMIN — MORPHINE SULFATE 4 MG: 4 INJECTION INTRAVENOUS at 07:03

## 2023-03-09 NOTE — DISCHARGE INSTRUCTIONS

## 2023-03-09 NOTE — ED PROVIDER NOTES
Encounter Date: 3/9/2023    SCRIBE #1 NOTE: I, Milagros Multani, am scribing for, and in the presence of,  Dee Mckeon PA-C. I have scribed the following portions of the note - Other sections scribed: HPI, ROS, PE.     History     Chief Complaint   Patient presents with    Arm Swelling     Let arm swelling was seen 2 days ago and told to follow up with ortho. Pt unable to get an appointment. Increase swelling and pain.     CC: arm swelling and pain.     HPI: 46 yo F with no pertinent PMHx, presenting to the ED with arm swelling and pain. She suffered acute fractures of the 4th and 5th metacarpals to her L hand 5d ago, evaluated at this facility and had an ulnar gutter splint placed in. She started experiencing increased pain and swelling to her hand 2d ago, progressively worsening since, prompting her return to the ED. Also reports associated paresthesias. Has been attempting Tx w/ Norco w/ only temporarily relief. No other modifying factors. Denies any new falls or injuries.     The history is provided by the patient.   Review of patient's allergies indicates:   Allergen Reactions    Metaxalone      Other reaction(s): Anaphylaxis    Soma [carisoprodol]      Other reaction(s): Anaphylaxis    Penicillins      Other reaction(s): Unknown     Past Medical History:   Diagnosis Date    Chronic kidney disease     NEPHROLITHIASIS    Endometriosis of pelvis     H/O colonoscopy 1/10/14    INTERNAL HEMORRHOID    History of psychiatric hospitalization     Hx of psychiatric care     Lactose intolerance     Ovarian cancer     Psychiatric problem     Therapy      Past Surgical History:   Procedure Laterality Date    APPENDECTOMY       SECTION      CHOLECYSTECTOMY      HYSTERECTOMY      OOPHORECTOMY      TUBAL LIGATION       Family History   Problem Relation Age of Onset    Mental illness Mother     Bipolar disorder Mother     Depression Mother     Heart disease Father         defibrillator    Cancer Maternal  Grandmother         ovarian cancer    Breast cancer Maternal Grandmother     Bipolar disorder Paternal Grandmother     Ovarian cancer Paternal Grandmother     Breast cancer Maternal Aunt     Breast cancer Paternal Aunt     Ovarian cancer Paternal Aunt     Ovarian cancer Paternal Aunt      Social History     Tobacco Use    Smoking status: Former     Packs/day: 0.50     Years: 22.00     Pack years: 11.00     Types: Cigarettes    Smokeless tobacco: Current   Substance Use Topics    Alcohol use: No    Drug use: Yes     Comment: uses all types of drug and prescription medications     Review of Systems   Constitutional:  Negative for chills and fever.   HENT:  Negative for congestion, rhinorrhea and sore throat.    Eyes:  Negative for visual disturbance.   Respiratory:  Negative for cough and shortness of breath.    Cardiovascular:  Negative for chest pain.   Gastrointestinal:  Negative for abdominal pain, diarrhea, nausea and vomiting.   Genitourinary:  Negative for dysuria, frequency and hematuria.   Musculoskeletal:  Positive for arthralgias (L hand, +swelling). Negative for back pain.   Skin:  Negative for rash.   Neurological:  Positive for numbness. Negative for dizziness, weakness and headaches.     Physical Exam     Initial Vitals [03/09/23 0657]   BP Pulse Resp Temp SpO2   120/76 86 18 98 °F (36.7 °C) 96 %      MAP       --         Physical Exam    Nursing note and vitals reviewed.  Constitutional: She appears well-developed and well-nourished. No distress.   HENT:   Head: Normocephalic.   Right Ear: External ear normal.   Left Ear: External ear normal.   Eyes: Conjunctivae are normal. Right eye exhibits no discharge. Left eye exhibits no discharge. No scleral icterus.   Neck: No tracheal deviation present.   Cardiovascular:            2+ radial pulses.    Pulmonary/Chest: No stridor. No respiratory distress.   Musculoskeletal:         General: Normal range of motion.      Comments: Splint in place to the LUE.  Swelling and Tenderness over the 3rd, 4th, and 5th metacarpals of the left hand. Limited ROM of the digits d/t pain and swelling.      Neurological: She is alert.   Slightly decreased sensation to the left 5th digit.    Skin: Skin is warm and dry. No rash noted. No erythema.   No pallor. Capillary refill less than 2 seconds.    Psychiatric: She has a normal mood and affect. Her behavior is normal. Judgment and thought content normal.       ED Course   Splint Application    Date/Time: 3/10/2023 7:22 AM  Performed by: Dee Mckeon PA-C  Authorized by: Kayden Wynn MD   Location details: left wrist  Splint type: ulnar gutter  Supplies used: Ortho-Glass  Post-procedure: The splinted body part was neurovascularly unchanged following the procedure.  Patient tolerance: Patient tolerated the procedure well with no immediate complications      Labs Reviewed - No data to display         Imaging Results    None          Medications   ketorolac injection 30 mg (30 mg Intramuscular Given 3/9/23 0731)   morphine injection 6 mg (4 mg Intramuscular Back Association 3/9/23 1200)     Medical Decision Making:   History:   Old Medical Records: I decided to obtain old medical records.  Clinical Tests:   Lab Tests: Ordered and Reviewed  ED Management:  47-year-old female presenting for worsening swelling to the left upper extremity after ulnar gutter splint placed at this facility for fractures.  She has ortho appointment scheduled for 3/15.  She is compliant with workup as prescribed for pain.  She denies any falls or trauma.  Denies any weakness, numbness.  Does have tingling to the 5th digit of the hand.  Exam above.  No evidence of compartment syndrome.  No neurovascular deficits.  Splint removed with resolution of patient's worsening pain.  Splint applied per procedure note.  Will have patient follow up with Orthopedics for further evaluation management.  Will have her return ER for worsening or as needed.         Scribe Attestation:   Scribe #1: I performed the above scribed service and the documentation accurately describes the services I performed. I attest to the accuracy of the note.                   Clinical Impression:   Final diagnoses:  [S62.313D] Closed displaced fracture of base of third metacarpal bone of left hand with routine healing, subsequent encounter (Primary)  [S62.315A] Closed displaced fracture of base of fourth metacarpal bone of left hand, initial encounter  [S62.317A] Closed displaced fracture of base of fifth metacarpal bone of left hand, initial encounter        ED Disposition Condition    Discharge Stable        I, Dee mckeon PA-C , personally performed the services described in this documentation. All medical record entries made by the scribe were at my direction and in my presence. I have reviewed the chart and agree that the record reflects my personal performance and is accurate and complete.    ED Prescriptions       Medication Sig Dispense Start Date End Date Auth. Provider    ibuprofen (ADVIL,MOTRIN) 600 MG tablet Take 1 tablet (600 mg total) by mouth every 6 (six) hours as needed for Pain. 30 tablet 3/9/2023 -- Dee Mckeon PA-C          Follow-up Information       Follow up With Specialties Details Why Contact Info    Andrés Steve MD Family Medicine Schedule an appointment as soon as possible for a visit in 2 days for follow up [address]  [facility]  [place] [address]  [phone]      [facility] - Emergency Dept Emergency Medicine Go to  As needed, If symptoms worsen [address]  [facility] [address] [phone]             Dee Mckeon PA-C  03/10/23 2548

## 2023-03-09 NOTE — ED NOTES
Manager and charge nurse notified. Pt received 4mg of Morphine IM; only one 4mg vial pulled from pyxis vs two vials (4mg each). Medication discrepancy resolved in pyxis.

## 2023-03-10 ENCOUNTER — LAB VISIT (OUTPATIENT)
Dept: LAB | Facility: HOSPITAL | Age: 48
End: 2023-03-10
Attending: FAMILY MEDICINE
Payer: MEDICAID

## 2023-03-10 DIAGNOSIS — E86.0 DEHYDRATION: Primary | ICD-10-CM

## 2023-03-10 LAB
ALBUMIN SERPL BCP-MCNC: 3.7 G/DL (ref 3.5–5.2)
ALP SERPL-CCNC: 95 U/L (ref 55–135)
ALT SERPL W/O P-5'-P-CCNC: 143 U/L (ref 10–44)
ANION GAP SERPL CALC-SCNC: 8 MMOL/L (ref 8–16)
AST SERPL-CCNC: 30 U/L (ref 10–40)
BASOPHILS # BLD AUTO: 0.06 K/UL (ref 0–0.2)
BASOPHILS NFR BLD: 1 % (ref 0–1.9)
BILIRUB SERPL-MCNC: 0.3 MG/DL (ref 0.1–1)
BUN SERPL-MCNC: 11 MG/DL (ref 6–20)
CALCIUM SERPL-MCNC: 9.1 MG/DL (ref 8.7–10.5)
CHLORIDE SERPL-SCNC: 104 MMOL/L (ref 95–110)
CO2 SERPL-SCNC: 27 MMOL/L (ref 23–29)
CREAT SERPL-MCNC: 0.9 MG/DL (ref 0.5–1.4)
DIFFERENTIAL METHOD: NORMAL
EOSINOPHIL # BLD AUTO: 0.2 K/UL (ref 0–0.5)
EOSINOPHIL NFR BLD: 2.9 % (ref 0–8)
ERYTHROCYTE [DISTWIDTH] IN BLOOD BY AUTOMATED COUNT: 14.1 % (ref 11.5–14.5)
ERYTHROCYTE [SEDIMENTATION RATE] IN BLOOD BY WESTERGREN METHOD: 20 MM/HR (ref 0–20)
EST. GFR  (NO RACE VARIABLE): >60 ML/MIN/1.73 M^2
GLUCOSE SERPL-MCNC: 77 MG/DL (ref 70–110)
HCT VFR BLD AUTO: 40.8 % (ref 37–48.5)
HGB BLD-MCNC: 13.5 G/DL (ref 12–16)
IMM GRANULOCYTES # BLD AUTO: 0.01 K/UL (ref 0–0.04)
IMM GRANULOCYTES NFR BLD AUTO: 0.2 % (ref 0–0.5)
LYMPHOCYTES # BLD AUTO: 1.8 K/UL (ref 1–4.8)
LYMPHOCYTES NFR BLD: 28.7 % (ref 18–48)
MCH RBC QN AUTO: 30.1 PG (ref 27–31)
MCHC RBC AUTO-ENTMCNC: 33.1 G/DL (ref 32–36)
MCV RBC AUTO: 91 FL (ref 82–98)
MONOCYTES # BLD AUTO: 0.4 K/UL (ref 0.3–1)
MONOCYTES NFR BLD: 6.4 % (ref 4–15)
NEUTROPHILS # BLD AUTO: 3.7 K/UL (ref 1.8–7.7)
NEUTROPHILS NFR BLD: 60.8 % (ref 38–73)
NRBC BLD-RTO: 0 /100 WBC
PLATELET # BLD AUTO: 265 K/UL (ref 150–450)
PMV BLD AUTO: 11 FL (ref 9.2–12.9)
POTASSIUM SERPL-SCNC: 4.6 MMOL/L (ref 3.5–5.1)
PROT SERPL-MCNC: 7.2 G/DL (ref 6–8.4)
RBC # BLD AUTO: 4.49 M/UL (ref 4–5.4)
SODIUM SERPL-SCNC: 139 MMOL/L (ref 136–145)
WBC # BLD AUTO: 6.13 K/UL (ref 3.9–12.7)

## 2023-03-10 PROCEDURE — 85025 COMPLETE CBC W/AUTO DIFF WBC: CPT | Performed by: FAMILY MEDICINE

## 2023-03-10 PROCEDURE — 80053 COMPREHEN METABOLIC PANEL: CPT | Performed by: FAMILY MEDICINE

## 2023-03-10 PROCEDURE — 36415 COLL VENOUS BLD VENIPUNCTURE: CPT | Performed by: FAMILY MEDICINE

## 2023-03-10 PROCEDURE — 85652 RBC SED RATE AUTOMATED: CPT | Performed by: FAMILY MEDICINE

## 2023-03-17 PROBLEM — S62.317A CLOSED DISPLACED FRACTURE OF BASE OF FIFTH METACARPAL BONE OF LEFT HAND: Status: ACTIVE | Noted: 2023-03-17

## 2023-03-29 ENCOUNTER — PATIENT OUTREACH (OUTPATIENT)
Dept: EMERGENCY MEDICINE | Facility: HOSPITAL | Age: 48
End: 2023-03-29
Payer: MEDICAID

## 2023-05-17 ENCOUNTER — PATIENT OUTREACH (OUTPATIENT)
Dept: EMERGENCY MEDICINE | Facility: HOSPITAL | Age: 48
End: 2023-05-17
Payer: MEDICAID

## 2023-09-17 ENCOUNTER — HOSPITAL ENCOUNTER (EMERGENCY)
Facility: HOSPITAL | Age: 48
Discharge: HOME | End: 2023-09-17
Attending: EMERGENCY MEDICINE
Payer: MEDICAID

## 2023-09-17 VITALS
HEART RATE: 114 BPM | SYSTOLIC BLOOD PRESSURE: 128 MMHG | OXYGEN SATURATION: 96 % | RESPIRATION RATE: 18 BRPM | HEIGHT: 63 IN | DIASTOLIC BLOOD PRESSURE: 92 MMHG | WEIGHT: 170 LBS | BODY MASS INDEX: 30.12 KG/M2 | TEMPERATURE: 98 F

## 2023-09-17 DIAGNOSIS — S61.412A LACERATION OF LEFT HAND WITHOUT FOREIGN BODY, INITIAL ENCOUNTER: Primary | ICD-10-CM

## 2023-09-17 PROCEDURE — 12002 RPR S/N/AX/GEN/TRNK2.6-7.5CM: CPT

## 2023-09-17 PROCEDURE — 25000003 PHARM REV CODE 250: Performed by: PHYSICIAN ASSISTANT

## 2023-09-17 PROCEDURE — 99283 EMERGENCY DEPT VISIT LOW MDM: CPT

## 2023-09-17 RX ORDER — OXYCODONE AND ACETAMINOPHEN 5; 325 MG/1; MG/1
1 TABLET ORAL
Status: COMPLETED | OUTPATIENT
Start: 2023-09-17 | End: 2023-09-17

## 2023-09-17 RX ORDER — LIDOCAINE HYDROCHLORIDE 10 MG/ML
10 INJECTION INFILTRATION; PERINEURAL
Status: COMPLETED | OUTPATIENT
Start: 2023-09-17 | End: 2023-09-17

## 2023-09-17 RX ORDER — MUPIROCIN 20 MG/G
1 OINTMENT TOPICAL
Status: COMPLETED | OUTPATIENT
Start: 2023-09-17 | End: 2023-09-17

## 2023-09-17 RX ADMIN — OXYCODONE HYDROCHLORIDE AND ACETAMINOPHEN 1 TABLET: 5; 325 TABLET ORAL at 02:09

## 2023-09-17 RX ADMIN — LIDOCAINE HYDROCHLORIDE 10 ML: 10 INJECTION, SOLUTION INFILTRATION; PERINEURAL at 04:09

## 2023-09-17 RX ADMIN — MUPIROCIN 22 G: 20 OINTMENT TOPICAL at 04:09

## 2023-09-17 NOTE — ED PROVIDER NOTES
Encounter Date: 2023       History     Chief Complaint   Patient presents with    Hand Injury     Ems called to 46yo female that was outside shooting a crossbow and her hand was cut by the string on her top left hand between the thumb and forefinger. Injury is bandaged and bleeding controlled.      47-year-old female with past medical history of bipolar disorder presents to the ED today for evaluation of a laceration to her left hand.  Patient reports she was shooting artery in her yd when the string snapped back and cut her left hand near the base of her thumb.  She reports her hand service believe she called 911 who brought her to the emergency department for laceration repair.  Reports current pain is rated as an 8/10 in severity.  Last tetanus shot was on 2023.    The history is provided by the patient. No  was used.     Review of patient's allergies indicates:   Allergen Reactions    Metaxalone      Other reaction(s): Anaphylaxis    Soma [carisoprodol]      Other reaction(s): Anaphylaxis    Penicillins      Other reaction(s): Unknown     Past Medical History:   Diagnosis Date    Chronic kidney disease     NEPHROLITHIASIS    Endometriosis of pelvis     H/O colonoscopy 1/10/14    INTERNAL HEMORRHOID    History of psychiatric hospitalization     Hx of psychiatric care     Lactose intolerance     Ovarian cancer     Psychiatric problem     Therapy      Past Surgical History:   Procedure Laterality Date    APPENDECTOMY       SECTION      CHOLECYSTECTOMY      ECTOPIC PREGNANCY SURGERY      HYSTERECTOMY      OOPHORECTOMY      OPEN REDUCTION AND INTERNAL FIXATION (ORIF) OF INJURY OF HAND Left 3/17/2023    Procedure: ORIF, HAND;  Surgeon: Daren Machado MD;  Location: [facility];  Service: Orthopedics;  Laterality: Left;  1. Open reduction internal fixation left 5th metacarpal base fracture  2. Closed reduction percutaneous pinning left 4th metacarpal neck fracture  3. Closed reduction  and percutaneous pinning left 3rd metacarpal base fracture       TUBAL LIGATION       Family History   Problem Relation Age of Onset    Mental illness Mother     Bipolar disorder Mother     Depression Mother     Heart disease Father         defibrillator    Cancer Maternal Grandmother         ovarian cancer    Breast cancer Maternal Grandmother     Bipolar disorder Paternal Grandmother     Ovarian cancer Paternal Grandmother     Breast cancer Maternal Aunt     Breast cancer Paternal Aunt     Ovarian cancer Paternal Aunt     Ovarian cancer Paternal Aunt      Social History     Tobacco Use    Smoking status: Every Day     Current packs/day: 1.00     Average packs/day: 1 pack/day for 22.0 years (22.0 ttl pk-yrs)     Types: Cigarettes    Smokeless tobacco: Current   Substance Use Topics    Alcohol use: No    Drug use: Yes     Comment: uses all types of drug and prescription medications     Review of Systems   Constitutional:  Negative for chills, fatigue and fever.   HENT:  Negative for congestion, sinus pressure, sneezing and sore throat.    Eyes:  Negative for visual disturbance.   Respiratory:  Negative for cough and shortness of breath.    Cardiovascular:  Negative for chest pain.   Gastrointestinal:  Negative for abdominal pain, nausea and vomiting.   Genitourinary:  Negative for difficulty urinating and dysuria.   Musculoskeletal:  Negative for back pain.   Skin:  Positive for wound. Negative for rash.   Neurological:  Negative for syncope and weakness.   Hematological:  Does not bruise/bleed easily.       Physical Exam     Initial Vitals [09/17/23 1351]   BP Pulse Resp Temp SpO2   120/83 (!) 118 18 98.1 °F (36.7 °C) 96 %      MAP       --         Physical Exam    Nursing note and vitals reviewed.  Constitutional: She appears well-developed and well-nourished. She is not diaphoretic. No distress.   HENT:   Head: Normocephalic and atraumatic.   Right Ear: External ear normal.   Left Ear: External ear normal.    Cardiovascular:  Normal rate, regular rhythm and intact distal pulses.           Pulmonary/Chest: Breath sounds normal. No respiratory distress.   Abdominal: Abdomen is soft. Bowel sounds are normal. There is no abdominal tenderness.   Musculoskeletal:        Hands:       Comments: Approximately 3.5-4 cm linear laceration to the dorsal aspect of the left hand near the area marked in the graphic. The wound appears to go through the fatty tissue, but does not involve muscle or other soft tissue structures. Pt has ROM of her thumb and fingers. Normal radial pulse. Normal cap refill.     Neurological: She is alert and oriented to person, place, and time. GCS score is 15. GCS eye subscore is 4. GCS verbal subscore is 5. GCS motor subscore is 6.   Skin: Skin is warm and dry.   Psychiatric: She has a normal mood and affect. Her behavior is normal.         ED Course   Lac Repair    Date/Time: 9/17/2023 4:04 PM    Performed by: Leandra Angel PA-C  Authorized by: Loyda Corona MD    Consent:     Consent obtained:  Verbal    Consent given by:  Patient  Universal protocol:     Patient identity confirmed:  Verbally with patient  Anesthesia:     Anesthesia method:  Local infiltration    Local anesthetic:  Lidocaine 1% w/o epi  Laceration details:     Location:  Hand    Hand location:  L hand, dorsum    Length (cm):  4  Pre-procedure details:     Preparation:  Patient was prepped and draped in usual sterile fashion and imaging obtained to evaluate for foreign bodies  Exploration:     Limited defect created (wound extended): no      Hemostasis achieved with:  Direct pressure    Imaging obtained: x-ray      Imaging outcome: foreign body not noted      Wound exploration: wound explored through full range of motion and entire depth of wound visualized    Treatment:     Area cleansed with:  Saline    Amount of cleaning:  Extensive    Irrigation solution:  Sterile saline    Irrigation method:  Pressure wash  Skin repair:      Repair method:  Sutures    Suture size:  5-0    Wound skin closure material used: ethilon.    Number of sutures:  9  Approximation:     Approximation:  Loose  Repair type:     Repair type:  Intermediate  Post-procedure details:     Dressing:  Antibiotic ointment    Procedure completion:  Tolerated well, no immediate complications  Lac Repair    Date/Time: 9/17/2023 4:43 PM    Performed by: Leandra Angel PA-C  Authorized by: Loyda Corona MD    Consent:     Consent obtained:  Verbal    Consent given by:  Patient  Universal protocol:     Patient identity confirmed:  Verbally with patient  Anesthesia:     Anesthesia method:  Local infiltration    Local anesthetic:  Lidocaine 1% w/o epi  Laceration details:     Location:  Hand    Hand location:  L hand, dorsum    Length (cm):  1.5  Pre-procedure details:     Preparation:  Imaging obtained to evaluate for foreign bodies  Exploration:     Imaging obtained: x-ray      Imaging outcome: foreign body not noted    Treatment:     Area cleansed with:  Saline    Amount of cleaning:  Standard    Irrigation solution:  Sterile saline    Irrigation method:  Pressure wash  Skin repair:     Repair method:  Sutures    Suture size:  6-0    Wound skin closure material used: ethilon.    Suture technique:  Simple interrupted    Number of sutures:  3  Approximation:     Approximation:  Close  Post-procedure details:     Procedure completion:  Tolerated well, no immediate complications    Labs Reviewed - No data to display       Imaging Results              X-Ray Hand 3 view Left (Final result)  Result time 09/17/23 14:53:02      Final result by Sukumar Hess MD (09/17/23 14:53:02)                   Impression:      1. No convincing acute displaced fracture or dislocation of the hand, correlation with any thenar soft tissue injury.      Electronically signed by: Sukumar Hess MD  Date:    09/17/2023  Time:    14:53               Narrative:    EXAMINATION:  XR HAND COMPLETE 3  VIEW LEFT    CLINICAL HISTORY:  hand injury;.    TECHNIQUE:  PA, lateral, and oblique views of the left hand were performed.    COMPARISON:  05/03/2023    FINDINGS:  Three views left hand.    There is osteopenia.  Plate and screw construct spans the 5th metatarsal.  Prior pin fixation has been removed.  There is remote fracture involving the distal aspect of the 4th metacarpal.  There are degenerative changes of the PIP and D IP joints.  Degenerative change noted of the 1st carpal metacarpal joint.  There appears to be soft tissue injury involving the thenar soft tissues.                                       Medications   oxyCODONE-acetaminophen 5-325 mg per tablet 1 tablet (1 tablet Oral Given 9/17/23 8338)   LIDOcaine HCL 10 mg/ml (1%) injection 10 mL (10 mLs Infiltration Given by Provider 9/17/23 9158)   mupirocin 2 % ointment 22 g (22 g Topical (Top) Given 9/17/23 8582)     Medical Decision Making  47-year-old female with past medical history of bipolar disorder presents to the ED today for evaluation of a laceration to her left hand.  Patient reports she was shooting artery in her yd when the string snapped back and cut her left hand near the base of her thumb.  She reports her hand service believe she called 911 who brought her to the emergency department for laceration repair.  Reports current pain is rated as an 8/10 in severity.  Last tetanus shot was on 03/04/2023. On physical exam there is an approximately 3.5-4 cm linear laceration to the dorsal aspect of the left hand near the area marked in the graphic. The wound appears to go through the fatty tissue, but does not involve muscle or other soft tissue structures. Pt has ROM of her thumb and fingers. Normal radial pulse. Normal cap refill.  X-ray of the left hand is negative for acute fracture, dislocation or foreign body.  Her wound was cleaned thoroughly with normal saline.  Patient was agreeable to laceration repair in the ER today.  She was  anesthetized with lidocaine and 9 sutures were placed to repair her wound.  3 sutures were used to repair a smaller bleeding laceration under beneath the larger lac. Patient was counseled that she needs to return for suture removal in 7-10 days.  Patient tolerated procedure well.  She was instructed on wound care best practices.    Amount and/or Complexity of Data Reviewed  Radiology: ordered. Decision-making details documented in ED Course.    Risk  Prescription drug management.               ED Course as of 09/17/23 1646   Sun Sep 17, 2023   1607 X-Ray Hand 3 view Left  No fracture, dislocation or foreign body  [MB]      ED Course User Index  [MB] Leandra Angel PA-C                    Clinical Impression:   Final diagnoses:  [S61.412A] Laceration of left hand without foreign body, initial encounter (Primary)        ED Disposition Condition    Discharge Stable          ED Prescriptions    None       Follow-up Information       Follow up With Specialties Details Why Contact Info    [facility] Emergency Dept Emergency Medicine Go in 10 days For suture removal [address]  [facility] [address] [phone]             Leandra Angel PA-C  09/17/23 1646

## 2023-09-17 NOTE — DISCHARGE INSTRUCTIONS
Please read the patient education material regarding wound care of your laceration.  You may use the prescribed antibiotic ointment as needed.  Please return to this ER, or any ER or urgent care for suture removal in 10 days.  Please note that your sutures must be removed in a timely manner to prevent abnormal healing.  If you start to notice fever, or pus draining from your wound please return to the ER.

## 2023-09-17 NOTE — ED TRIAGE NOTES
Pt arrived to the ED via EMS. Pt. Reports she was shooting a crossbow and it was to close to her left hand and injured her hand. Pt. Is noted with a laceration between her thumb and 2nd finger, ( 3.5 CM length X 1 CM wide). Pt. Reports she has numbness about the cut however has AROM to fingers and thumb.

## 2023-11-25 ENCOUNTER — HOSPITAL ENCOUNTER (EMERGENCY)
Facility: HOSPITAL | Age: 48
Discharge: LEFT BEFORE DISCHARGE | End: 2023-11-25
Attending: EMERGENCY MEDICINE
Payer: MEDICAID

## 2023-11-25 VITALS
TEMPERATURE: 98 F | DIASTOLIC BLOOD PRESSURE: 91 MMHG | BODY MASS INDEX: 28.68 KG/M2 | HEIGHT: 64 IN | HEART RATE: 59 BPM | WEIGHT: 168 LBS | OXYGEN SATURATION: 97 % | RESPIRATION RATE: 18 BRPM | SYSTOLIC BLOOD PRESSURE: 120 MMHG
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
## 2023-11-25 PROCEDURE — 99281 EMR DPT VST MAYX REQ PHY/QHP: CPT

## 2023-11-25 NOTE — ED TRIAGE NOTES
Miriam Saini Amy, a 48 y.o. female presents to the ED w/ complaint of L wirst pain (7/10) and swelling s/p slip and fall x 2 days ago. Pt reports surgery to L hand x 6 months ago. Pt denies any other complaints. Pt is AAOX4.

## 2023-11-26 NOTE — ED PROVIDER NOTES
Encounter Date: 2023       History     Chief Complaint   Patient presents with    Hand Pain     Pt to ER with reports of slip and fall day before yesterday and landed on left wrist. + swelling rating pain 7/10. Pt with hx of surgery to hand 6 months prior      Pt eloped from ED prior to being seen or evaluated by myself.        Review of patient's allergies indicates:   Allergen Reactions    Metaxalone      Other reaction(s): Anaphylaxis    Soma [carisoprodol]      Other reaction(s): Anaphylaxis    Penicillins      Other reaction(s): Unknown     Past Medical History:   Diagnosis Date    Chronic kidney disease     NEPHROLITHIASIS    Endometriosis of pelvis     H/O colonoscopy 1/10/14    INTERNAL HEMORRHOID    History of psychiatric hospitalization     Hx of psychiatric care     Lactose intolerance     Ovarian cancer     Psychiatric problem     Therapy      Past Surgical History:   Procedure Laterality Date    APPENDECTOMY       SECTION      CHOLECYSTECTOMY      ECTOPIC PREGNANCY SURGERY      HYSTERECTOMY      OOPHORECTOMY      OPEN REDUCTION AND INTERNAL FIXATION (ORIF) OF INJURY OF HAND Left 3/17/2023    Procedure: ORIF, HAND;  Surgeon: Daren Machado MD;  Location: [facility];  Service: Orthopedics;  Laterality: Left;  1. Open reduction internal fixation left 5th metacarpal base fracture  2. Closed reduction percutaneous pinning left 4th metacarpal neck fracture  3. Closed reduction and percutaneous pinning left 3rd metacarpal base fracture       TUBAL LIGATION       Family History   Problem Relation Age of Onset    Mental illness Mother     Bipolar disorder Mother     Depression Mother     Heart disease Father         defibrillator    Cancer Maternal Grandmother         ovarian cancer    Breast cancer Maternal Grandmother     Bipolar disorder Paternal Grandmother     Ovarian cancer Paternal Grandmother     Breast cancer Maternal Aunt     Breast cancer Paternal Aunt     Ovarian cancer Paternal Aunt      Ovarian cancer Paternal Aunt      Social History     Tobacco Use    Smoking status: Every Day     Current packs/day: 1.00     Average packs/day: 1 pack/day for 22.0 years (22.0 ttl pk-yrs)     Types: Cigarettes    Smokeless tobacco: Current   Substance Use Topics    Alcohol use: No    Drug use: Yes     Comment: uses all types of drug and prescription medications     Review of Systems    Physical Exam     Initial Vitals [11/25/23 0956]   BP Pulse Resp Temp SpO2   (!) 120/91 (!) 59 18 97.9 °F (36.6 °C) 97 %      MAP       --         Physical Exam    ED Course   Procedures  Labs Reviewed - No data to display       Imaging Results    None          Medications - No data to display  Medical Decision Making                                 Clinical Impression:  Final diagnoses:  [W19.XXXA] Fall (Primary)          ED Disposition Condition    LWBS before Quick Look                 Leandra Angel PA-C  11/25/23 1948

## 2024-01-14 ENCOUNTER — HOSPITAL ENCOUNTER (EMERGENCY)
Facility: HOSPITAL | Age: 49
Discharge: LEFT BEFORE DISCHARGE | End: 2024-01-14
Payer: MEDICAID

## 2024-01-14 VITALS
HEART RATE: 77 BPM | DIASTOLIC BLOOD PRESSURE: 103 MMHG | SYSTOLIC BLOOD PRESSURE: 158 MMHG | RESPIRATION RATE: 18 BRPM | TEMPERATURE: 98 F | OXYGEN SATURATION: 99 % | HEIGHT: 63 IN | WEIGHT: 160 LBS | BODY MASS INDEX: 28.35 KG/M2

## 2024-01-14 DIAGNOSIS — R07.89 LEFT-SIDED CHEST WALL PAIN: ICD-10-CM

## 2024-01-14 PROCEDURE — 99900041 HC LEFT WITHOUT BEING SEEN- EMERGENCY

## 2024-01-16 ENCOUNTER — HOSPITAL ENCOUNTER (EMERGENCY)
Facility: HOSPITAL | Age: 49
Discharge: HOME | End: 2024-01-16
Attending: EMERGENCY MEDICINE
Payer: MEDICAID

## 2024-01-16 VITALS
DIASTOLIC BLOOD PRESSURE: 98 MMHG | HEIGHT: 63 IN | HEART RATE: 59 BPM | OXYGEN SATURATION: 97 % | RESPIRATION RATE: 18 BRPM | WEIGHT: 160 LBS | SYSTOLIC BLOOD PRESSURE: 134 MMHG | TEMPERATURE: 98 F | BODY MASS INDEX: 28.35 KG/M2

## 2024-01-16 DIAGNOSIS — R10.13 EPIGASTRIC PAIN: ICD-10-CM

## 2024-01-16 DIAGNOSIS — R10.12 LUQ ABDOMINAL PAIN: ICD-10-CM

## 2024-01-16 DIAGNOSIS — K52.9 ENTERITIS: Primary | ICD-10-CM

## 2024-01-16 LAB
ALBUMIN SERPL BCP-MCNC: 3.6 G/DL (ref 3.5–5.2)
ALP SERPL-CCNC: 62 U/L (ref 55–135)
ALT SERPL W/O P-5'-P-CCNC: 29 U/L (ref 10–44)
ANION GAP SERPL CALC-SCNC: 5 MMOL/L (ref 8–16)
AST SERPL-CCNC: 20 U/L (ref 10–40)
BASOPHILS # BLD AUTO: 0.05 K/UL (ref 0–0.2)
BASOPHILS NFR BLD: 0.8 % (ref 0–1.9)
BILIRUB SERPL-MCNC: 0.2 MG/DL (ref 0.1–1)
BILIRUB UR QL STRIP: NEGATIVE
BUN SERPL-MCNC: 13 MG/DL (ref 6–20)
CALCIUM SERPL-MCNC: 8.9 MG/DL (ref 8.7–10.5)
CHLORIDE SERPL-SCNC: 105 MMOL/L (ref 95–110)
CLARITY UR: CLEAR
CO2 SERPL-SCNC: 30 MMOL/L (ref 23–29)
COLOR UR: COLORLESS
CREAT SERPL-MCNC: 0.8 MG/DL (ref 0.5–1.4)
DIFFERENTIAL METHOD BLD: ABNORMAL
EOSINOPHIL # BLD AUTO: 0.2 K/UL (ref 0–0.5)
EOSINOPHIL NFR BLD: 2.7 % (ref 0–8)
ERYTHROCYTE [DISTWIDTH] IN BLOOD BY AUTOMATED COUNT: 13.1 % (ref 11.5–14.5)
EST. GFR  (NO RACE VARIABLE): >60 ML/MIN/1.73 M^2
GLUCOSE SERPL-MCNC: 93 MG/DL (ref 70–110)
GLUCOSE UR QL STRIP: NEGATIVE
HCT VFR BLD AUTO: 40.4 % (ref 37–48.5)
HGB BLD-MCNC: 13.1 G/DL (ref 12–16)
HGB UR QL STRIP: NEGATIVE
IMM GRANULOCYTES # BLD AUTO: 0.04 K/UL (ref 0–0.04)
IMM GRANULOCYTES NFR BLD AUTO: 0.6 % (ref 0–0.5)
KETONES UR QL STRIP: NEGATIVE
LEUKOCYTE ESTERASE UR QL STRIP: NEGATIVE
LIPASE SERPL-CCNC: 31 U/L (ref 4–60)
LYMPHOCYTES # BLD AUTO: 2 K/UL (ref 1–4.8)
LYMPHOCYTES NFR BLD: 30 % (ref 18–48)
MCH RBC QN AUTO: 29.8 PG (ref 27–31)
MCHC RBC AUTO-ENTMCNC: 32.4 G/DL (ref 32–36)
MCV RBC AUTO: 92 FL (ref 82–98)
MONOCYTES # BLD AUTO: 0.4 K/UL (ref 0.3–1)
MONOCYTES NFR BLD: 5.5 % (ref 4–15)
NEUTROPHILS # BLD AUTO: 4 K/UL (ref 1.8–7.7)
NEUTROPHILS NFR BLD: 60.4 % (ref 38–73)
NITRITE UR QL STRIP: NEGATIVE
NRBC BLD-RTO: 0 /100 WBC
PH UR STRIP: 7 [PH] (ref 5–8)
PLATELET # BLD AUTO: 297 K/UL (ref 150–450)
PMV BLD AUTO: 11.5 FL (ref 9.2–12.9)
POTASSIUM SERPL-SCNC: 3.9 MMOL/L (ref 3.5–5.1)
PROT SERPL-MCNC: 6.6 G/DL (ref 6–8.4)
PROT UR QL STRIP: NEGATIVE
RBC # BLD AUTO: 4.4 M/UL (ref 4–5.4)
SODIUM SERPL-SCNC: 140 MMOL/L (ref 136–145)
SP GR UR STRIP: 1.01 (ref 1–1.03)
TROPONIN I SERPL DL<=0.01 NG/ML-MCNC: <0.006 NG/ML (ref 0–0.03)
URN SPEC COLLECT METH UR: ABNORMAL
UROBILINOGEN UR STRIP-ACNC: NEGATIVE EU/DL
WBC # BLD AUTO: 6.59 K/UL (ref 3.9–12.7)

## 2024-01-16 PROCEDURE — 96374 THER/PROPH/DIAG INJ IV PUSH: CPT | Mod: 59

## 2024-01-16 PROCEDURE — 25500020 PHARM REV CODE 255: Performed by: NURSE PRACTITIONER

## 2024-01-16 PROCEDURE — 80053 COMPREHEN METABOLIC PANEL: CPT | Performed by: NURSE PRACTITIONER

## 2024-01-16 PROCEDURE — 85025 COMPLETE CBC W/AUTO DIFF WBC: CPT | Performed by: NURSE PRACTITIONER

## 2024-01-16 PROCEDURE — 81003 URINALYSIS AUTO W/O SCOPE: CPT | Performed by: EMERGENCY MEDICINE

## 2024-01-16 PROCEDURE — 83690 ASSAY OF LIPASE: CPT | Performed by: NURSE PRACTITIONER

## 2024-01-16 PROCEDURE — 96361 HYDRATE IV INFUSION ADD-ON: CPT

## 2024-01-16 PROCEDURE — 93005 ELECTROCARDIOGRAM TRACING: CPT

## 2024-01-16 PROCEDURE — 99285 EMERGENCY DEPT VISIT HI MDM: CPT | Mod: 25

## 2024-01-16 PROCEDURE — 93010 ELECTROCARDIOGRAM REPORT: CPT | Mod: ,,, | Performed by: INTERNAL MEDICINE

## 2024-01-16 PROCEDURE — 84484 ASSAY OF TROPONIN QUANT: CPT | Performed by: NURSE PRACTITIONER

## 2024-01-16 PROCEDURE — 63600175 PHARM REV CODE 636 W HCPCS: Performed by: NURSE PRACTITIONER

## 2024-01-16 PROCEDURE — 25000003 PHARM REV CODE 250: Performed by: NURSE PRACTITIONER

## 2024-01-16 RX ORDER — DOXYCYCLINE 100 MG/1
100 CAPSULE ORAL EVERY 12 HOURS
Qty: 10 CAPSULE | Refills: 0 | Status: SHIPPED | OUTPATIENT
Start: 2024-01-16 | End: 2024-01-21

## 2024-01-16 RX ORDER — KETOROLAC TROMETHAMINE 30 MG/ML
15 INJECTION, SOLUTION INTRAMUSCULAR; INTRAVENOUS
Status: COMPLETED | OUTPATIENT
Start: 2024-01-16 | End: 2024-01-16

## 2024-01-16 RX ORDER — NAPROXEN 500 MG/1
500 TABLET ORAL EVERY 12 HOURS PRN
Qty: 20 TABLET | Refills: 0 | Status: SHIPPED | OUTPATIENT
Start: 2024-01-16

## 2024-01-16 RX ORDER — TIZANIDINE 2 MG/1
2 TABLET ORAL EVERY 8 HOURS PRN
Qty: 15 TABLET | Refills: 0 | Status: SHIPPED | OUTPATIENT
Start: 2024-01-16

## 2024-01-16 RX ADMIN — IOHEXOL 75 ML: 350 INJECTION, SOLUTION INTRAVENOUS at 01:01

## 2024-01-16 RX ADMIN — SODIUM CHLORIDE 1000 ML: 9 INJECTION, SOLUTION INTRAVENOUS at 12:01

## 2024-01-16 RX ADMIN — KETOROLAC TROMETHAMINE 15 MG: 30 INJECTION INTRAMUSCULAR; INTRAVENOUS at 12:01

## 2024-01-16 NOTE — DISCHARGE INSTRUCTIONS

## 2024-01-16 NOTE — ED PROVIDER NOTES
Encounter Date: 2024    SCRIBE #1 NOTE: I, Radha Morin, am scribing for, and in the presence of,  Jaiden Stockton NP. I have scribed the following portions of the note - Other sections scribed: HPI, ROS, PE.       History     Chief Complaint   Patient presents with    Abdominal Pain     Pt BIB EMS from home for pain to her left side. Pt stated she fell a few days ago. Pt stated pain worsens with movement and palpation. Pt denied chest pain or SOB.     Miriam Reyes is a 48 y.o. female, with a PMHx of ovarian cancer, who presents to the ED with complaint of worsening LUQ abdominal pain that intermittently radiates to the epigastrium onset 1 week ago. Patient reports the pain got much worse after she tripped over a rug and fell forward a few days ago. Patient states she had pleurisy 4 years ago and it caused pain in the same area. Patient notes the pain is exacerbated with deep breaths, movements, laying down, and repositioning. She states the pain is alleviated when pressure is applied with her hand. She reports Tylenol and Ibuprofen have not alleviated her pain. No other exacerbating or alleviating factors. Denies any other associated symptoms.      The history is provided by the patient. No  was used.     Review of patient's allergies indicates:   Allergen Reactions    Metaxalone      Other reaction(s): Anaphylaxis    Soma [carisoprodol]      Other reaction(s): Anaphylaxis    Penicillins      Other reaction(s): Unknown     Past Medical History:   Diagnosis Date    Chronic kidney disease     NEPHROLITHIASIS    Endometriosis of pelvis     H/O colonoscopy 1/10/14    INTERNAL HEMORRHOID    History of psychiatric hospitalization     Hx of psychiatric care     Lactose intolerance     Ovarian cancer     Psychiatric problem     Therapy      Past Surgical History:   Procedure Laterality Date    APPENDECTOMY       SECTION      CHOLECYSTECTOMY      ECTOPIC PREGNANCY SURGERY       HYSTERECTOMY      OOPHORECTOMY      OPEN REDUCTION AND INTERNAL FIXATION (ORIF) OF INJURY OF HAND Left 3/17/2023    Procedure: ORIF, HAND;  Surgeon: Daren Machado MD;  Location: [facility];  Service: Orthopedics;  Laterality: Left;  1. Open reduction internal fixation left 5th metacarpal base fracture  2. Closed reduction percutaneous pinning left 4th metacarpal neck fracture  3. Closed reduction and percutaneous pinning left 3rd metacarpal base fracture       TUBAL LIGATION       Family History   Problem Relation Age of Onset    Mental illness Mother     Bipolar disorder Mother     Depression Mother     Heart disease Father         defibrillator    Cancer Maternal Grandmother         ovarian cancer    Breast cancer Maternal Grandmother     Bipolar disorder Paternal Grandmother     Ovarian cancer Paternal Grandmother     Breast cancer Maternal Aunt     Breast cancer Paternal Aunt     Ovarian cancer Paternal Aunt     Ovarian cancer Paternal Aunt      Social History     Tobacco Use    Smoking status: Every Day     Current packs/day: 1.00     Average packs/day: 1 pack/day for 22.0 years (22.0 ttl pk-yrs)     Types: Cigarettes    Smokeless tobacco: Current   Substance Use Topics    Alcohol use: No    Drug use: Yes     Comment: uses all types of drug and prescription medications     Review of Systems   Constitutional:  Negative for chills and fever.   HENT:  Negative for congestion, rhinorrhea and sore throat.    Eyes:  Negative for visual disturbance.   Respiratory:  Negative for cough and shortness of breath.    Cardiovascular:  Negative for chest pain.   Gastrointestinal:  Positive for abdominal pain. Negative for diarrhea, nausea and vomiting.   Genitourinary:  Negative for dysuria, frequency and hematuria.   Musculoskeletal:  Negative for back pain.   Skin:  Negative for rash.   Neurological:  Negative for dizziness, weakness and headaches.       Physical Exam     Initial Vitals [01/16/24 1114]   BP Pulse Resp Temp  SpO2   (!) 140/86 62 18 97.8 °F (36.6 °C) 98 %      MAP       --         Physical Exam    Nursing note and vitals reviewed.  Constitutional: She appears well-developed and well-nourished. She is not diaphoretic. No distress.   HENT:   Head: Normocephalic and atraumatic.   Right Ear: External ear normal.   Left Ear: External ear normal.   Nose: Nose normal.   Eyes: Conjunctivae and EOM are normal. Right eye exhibits no discharge. Left eye exhibits no discharge.   Neck: Neck supple. No tracheal deviation present.   Normal range of motion.  Cardiovascular:  Normal rate.           Pulmonary/Chest: No stridor. No respiratory distress.   Abdominal: Abdomen is soft. She exhibits no distension. There is abdominal tenderness in the epigastric area and left upper quadrant.   Tenderness to left upper quadrant and epigastric area.  Pain is exacerbated with flexion, extension, rotation, and other repositioning.  No rigidity or guarding.  Abdomen is soft.   Musculoskeletal:         General: No tenderness. Normal range of motion.      Cervical back: Normal range of motion and neck supple.     Neurological: She is alert and oriented to person, place, and time. She has normal strength. No cranial nerve deficit or sensory deficit.   Skin: Skin is warm and dry.   Psychiatric: She has a normal mood and affect. Her behavior is normal. Judgment and thought content normal.         ED Course   Procedures  Labs Reviewed   URINALYSIS, REFLEX TO URINE CULTURE - Abnormal; Notable for the following components:       Result Value    Color, UA Colorless (*)     All other components within normal limits    Narrative:     Specimen Source->Urine   CBC W/ AUTO DIFFERENTIAL - Abnormal; Notable for the following components:    Immature Granulocytes 0.6 (*)     All other components within normal limits   COMPREHENSIVE METABOLIC PANEL - Abnormal; Notable for the following components:    CO2 30 (*)     Anion Gap 5 (*)     All other components within  normal limits   TROPONIN I   LIPASE     EKG Readings: (Independently Interpreted)   Initial Reading: No STEMI. Rhythm: Normal Sinus Rhythm. Heart Rate: 64. Ectopy: No Ectopy. Conduction: Normal. ST Segments: Normal ST Segments. T Waves: Normal. Clinical Impression: Normal Sinus Rhythm       Imaging Results               CT Abdomen Pelvis With IV Contrast NO Oral Contrast (Final result)  Result time 01/16/24 13:19:31      Final result by Sukumar Hess MD (01/16/24 13:19:31)                   Impression:      This report was flagged in Epic as abnormal.    1. There is some indistinctness about the urinary bladder, correlation with urinalysis recommended to exclude changes of cystitis.  2. Thickening of a few proximal jejunal loops.  This may be on the basis of peristalsis however early changes of enteritis are a consideration and correlation is needed.  3. Hepatomegaly noting possible steatosis.  Correlation with LFTs advised.  4. Please see above for several additional findings.      Electronically signed by: Sukumar Hess MD  Date:    01/16/2024  Time:    13:19               Narrative:    EXAMINATION:  CT ABDOMEN PELVIS WITH IV CONTRAST    CLINICAL HISTORY:  Epigastric pain;    TECHNIQUE:  Low dose axial images, sagittal and coronal reformations were obtained from the lung bases to the pubic symphysis following the IV administration of 75 mL of Omnipaque 350 .  Oral contrast was not given.    COMPARISON:  CT 02/21/2023    FINDINGS:  Images of the lower thorax are remarkable for a 2-3 mm pulmonary nodule within the right lower lobe, stable.    There are a few scattered right cardiophrenic lymph nodes, all subcentimeter, similar to the previous examination.  The liver is somewhat hypoattenuating, possibly reflecting steatosis versus sequela of contrast phase.  Correlation with LFTs recommended.  The liver is enlarged.  The spleen and adrenal glands are unremarkable.  The pancreas is grossly unremarkable.  The  "gallbladder is surgically absent.  There is a small hiatal hernia.  The stomach is decompressed without wall thickening.  The portal vein, splenic vein, SMV, celiac axis and SMA all are patent.  No significant abdominal lymphadenopathy.    There is left renal cortical thinning and regions of scarring/prior insult.  There is no hydronephrosis or nephrolithiasis.  The bilateral ureters are unable to be followed in their entirety to the urinary bladder, no definite calculi seen or secondary findings to suggest obstructive uropathy.  The urinary bladder is mildly distended noting some indistinctness about the walls.  The uterus is absent.  There is a small amount of fluid in the pelvis.    There are a few scattered colonic diverticula without surrounding inflammation to suggest diverticulitis.  The terminal ileum is unremarkable.  There is surgical change of appendectomy.  The small bowel is remarkable for a few thickened proximal jejunal loops without obstruction.  There are a few scattered shotty periaortic, pericaval, and mesenteric lymph nodes.  There is atherosclerotic calcification of the aorta and its branches.    There is osteopenia.  There are degenerative changes of the bilateral femoroacetabular joints, pubic symphysis, and spine.  No significant inguinal lymphadenopathy.                                       X-Ray Chest PA And Lateral (Final result)  Result time 01/16/24 12:28:42      Final result by Dl Osei MD (01/16/24 12:28:42)                   Impression:      No acute cardiopulmonary finding.      Electronically signed by: Dl Osei MD  Date:    01/16/2024  Time:    12:28               Narrative:    EXAMINATION:  XR CHEST PA AND LATERAL    CLINICAL HISTORY:  Provided history is "  Epigastric pain".    TECHNIQUE:  Frontal and lateral views of the chest were performed.    COMPARISON:  03/15/2023.    FINDINGS:  Cardiac silhouette is not enlarged. No focal consolidation.  No sizable " pleural effusion.  No pneumothorax.                                       Medications   ketorolac injection 15 mg (15 mg Intravenous Given 1/16/24 1223)   sodium chloride 0.9% bolus 1,000 mL 1,000 mL (0 mLs Intravenous Stopped 1/16/24 1354)   iohexoL (OMNIPAQUE 350) injection 75 mL (75 mLs Intravenous Given 1/16/24 1301)     Medical Decision Making  Amount and/or Complexity of Data Reviewed  External Data Reviewed: labs and notes.  Labs: ordered. Decision-making details documented in ED Course.  Radiology: ordered. Decision-making details documented in ED Course.  ECG/medicine tests: ordered and independent interpretation performed. Decision-making details documented in ED Course.    Risk  Prescription drug management.            Scribe Attestation:   Scribe #1: I performed the above scribed service and the documentation accurately describes the services I performed. I attest to the accuracy of the note.                             I, Jaiden Stockton NP, personally performed the services described in this documentation. All medical record entries made by the scribe were at my direction and in my presence. I have reviewed the chart and agree that the record reflects my personal performance and is accurate and complete.       Clinical Impression:  Final diagnoses:  [R10.13] Epigastric pain  [R10.12] LUQ abdominal pain  [K52.9] Enteritis (Primary)          ED Disposition Condition    Discharge Stable          ED Prescriptions       Medication Sig Dispense Start Date End Date Auth. Provider    doxycycline (MONODOX) 100 MG capsule Take 1 capsule (100 mg total) by mouth every 12 (twelve) hours. for 5 days 10 capsule 1/16/2024 1/21/2024 Jaiden Stockton NP    naproxen (NAPROSYN) 500 MG tablet Take 1 tablet (500 mg total) by mouth every 12 (twelve) hours as needed (Pain). 20 tablet 1/16/2024 -- Jaiden Stockton NP    tiZANidine (ZANAFLEX) 2 MG tablet Take 1 tablet (2 mg total) by mouth every 8 (eight) hours as needed  (Muscle Spasms). 15 tablet 1/16/2024 -- Jaiden Stockton, MONICA          Follow-up Information       Follow up With Specialties Details Why Contact Info    Andrés Steve MD Family Medicine Schedule an appointment as soon as possible for a visit in 1 week For further evaluation [address]  [facility]  [place] [address]  [phone]      [facility] - Emergency Dept Emergency Medicine Go to  If symptoms worsen, As needed [address] [facility] [place] [id number] [phone]             Jaiden Stockton, MONICA  01/16/24 0300

## 2024-04-18 ENCOUNTER — HOSPITAL ENCOUNTER (EMERGENCY)
Facility: HOSPITAL | Age: 49
Discharge: HOME | End: 2024-04-18
Attending: EMERGENCY MEDICINE
Payer: MEDICAID

## 2024-04-18 VITALS
SYSTOLIC BLOOD PRESSURE: 112 MMHG | TEMPERATURE: 99 F | BODY MASS INDEX: 30.12 KG/M2 | WEIGHT: 170 LBS | DIASTOLIC BLOOD PRESSURE: 84 MMHG | RESPIRATION RATE: 20 BRPM | HEART RATE: 75 BPM | HEIGHT: 63 IN | OXYGEN SATURATION: 96 %

## 2024-04-18 DIAGNOSIS — R19.7 DIARRHEA, UNSPECIFIED TYPE: ICD-10-CM

## 2024-04-18 DIAGNOSIS — R07.9 CHEST PAIN: ICD-10-CM

## 2024-04-18 DIAGNOSIS — R06.02 SHORTNESS OF BREATH: ICD-10-CM

## 2024-04-18 DIAGNOSIS — R11.2 NAUSEA AND VOMITING, UNSPECIFIED VOMITING TYPE: Primary | ICD-10-CM

## 2024-04-18 LAB
ALBUMIN SERPL BCP-MCNC: 5 G/DL (ref 3.5–5.2)
ALP SERPL-CCNC: 96 U/L (ref 55–135)
ALT SERPL W/O P-5'-P-CCNC: 37 U/L (ref 10–44)
AMPHET+METHAMPHET UR QL: NEGATIVE
ANION GAP SERPL CALC-SCNC: 11 MMOL/L (ref 8–16)
AST SERPL-CCNC: 29 U/L (ref 10–40)
BARBITURATES UR QL SCN>200 NG/ML: NEGATIVE
BASOPHILS # BLD AUTO: 0.06 K/UL (ref 0–0.2)
BASOPHILS NFR BLD: 0.7 % (ref 0–1.9)
BENZODIAZ UR QL SCN>200 NG/ML: ABNORMAL
BILIRUB SERPL-MCNC: 0.6 MG/DL (ref 0.1–1)
BILIRUB UR QL STRIP: NEGATIVE
BNP SERPL-MCNC: <10 PG/ML (ref 0–99)
BUN SERPL-MCNC: 19 MG/DL (ref 6–20)
BZE UR QL SCN: NEGATIVE
CALCIUM SERPL-MCNC: 10.4 MG/DL (ref 8.7–10.5)
CANNABINOIDS UR QL SCN: ABNORMAL
CHLORIDE SERPL-SCNC: 103 MMOL/L (ref 95–110)
CLARITY UR: CLEAR
CO2 SERPL-SCNC: 24 MMOL/L (ref 23–29)
COLOR UR: YELLOW
CREAT SERPL-MCNC: 1.1 MG/DL (ref 0.5–1.4)
CREAT UR-MCNC: 241.7 MG/DL (ref 15–325)
CTP QC/QA: YES
CTP QC/QA: YES
DIFFERENTIAL METHOD BLD: ABNORMAL
EOSINOPHIL # BLD AUTO: 0.1 K/UL (ref 0–0.5)
EOSINOPHIL NFR BLD: 0.7 % (ref 0–8)
ERYTHROCYTE [DISTWIDTH] IN BLOOD BY AUTOMATED COUNT: 13 % (ref 11.5–14.5)
EST. GFR  (NO RACE VARIABLE): >60 ML/MIN/1.73 M^2
GLUCOSE SERPL-MCNC: 94 MG/DL (ref 70–110)
GLUCOSE UR QL STRIP: NEGATIVE
HCT VFR BLD AUTO: 52.5 % (ref 37–48.5)
HGB BLD-MCNC: 17.2 G/DL (ref 12–16)
HGB UR QL STRIP: NEGATIVE
IMM GRANULOCYTES # BLD AUTO: 0.01 K/UL (ref 0–0.04)
IMM GRANULOCYTES NFR BLD AUTO: 0.1 % (ref 0–0.5)
INR PPP: 1 (ref 0.8–1.2)
KETONES UR QL STRIP: NEGATIVE
LEUKOCYTE ESTERASE UR QL STRIP: NEGATIVE
LIPASE SERPL-CCNC: 47 U/L (ref 4–60)
LYMPHOCYTES # BLD AUTO: 2 K/UL (ref 1–4.8)
LYMPHOCYTES NFR BLD: 24 % (ref 18–48)
MCH RBC QN AUTO: 29.4 PG (ref 27–31)
MCHC RBC AUTO-ENTMCNC: 32.8 G/DL (ref 32–36)
MCV RBC AUTO: 90 FL (ref 82–98)
METHADONE UR QL SCN>300 NG/ML: NEGATIVE
MONOCYTES # BLD AUTO: 0.4 K/UL (ref 0.3–1)
MONOCYTES NFR BLD: 4.3 % (ref 4–15)
NEUTROPHILS # BLD AUTO: 5.8 K/UL (ref 1.8–7.7)
NEUTROPHILS NFR BLD: 70.2 % (ref 38–73)
NITRITE UR QL STRIP: NEGATIVE
NRBC BLD-RTO: 0 /100 WBC
OHS QRS DURATION: 82 MS
OHS QTC CALCULATION: 445 MS
OPIATES UR QL SCN: NEGATIVE
PCP UR QL SCN>25 NG/ML: NEGATIVE
PH UR STRIP: 6 [PH] (ref 5–8)
PLATELET # BLD AUTO: 300 K/UL (ref 150–450)
PMV BLD AUTO: 11.3 FL (ref 9.2–12.9)
POC MOLECULAR INFLUENZA A AGN: NEGATIVE
POC MOLECULAR INFLUENZA B AGN: NEGATIVE
POTASSIUM SERPL-SCNC: 4.1 MMOL/L (ref 3.5–5.1)
PROT SERPL-MCNC: 9.2 G/DL (ref 6–8.4)
PROT UR QL STRIP: ABNORMAL
PROTHROMBIN TIME: 10.9 SEC (ref 9–12.5)
RBC # BLD AUTO: 5.85 M/UL (ref 4–5.4)
SARS-COV-2 RDRP RESP QL NAA+PROBE: NEGATIVE
SODIUM SERPL-SCNC: 138 MMOL/L (ref 136–145)
SP GR UR STRIP: 1.03 (ref 1–1.03)
TOXICOLOGY INFORMATION: ABNORMAL
TROPONIN I SERPL DL<=0.01 NG/ML-MCNC: <0.006 NG/ML (ref 0–0.03)
URN SPEC COLLECT METH UR: ABNORMAL
UROBILINOGEN UR STRIP-ACNC: NEGATIVE EU/DL
WBC # BLD AUTO: 8.32 K/UL (ref 3.9–12.7)

## 2024-04-18 PROCEDURE — 96361 HYDRATE IV INFUSION ADD-ON: CPT

## 2024-04-18 PROCEDURE — 85610 PROTHROMBIN TIME: CPT

## 2024-04-18 PROCEDURE — 87635 SARS-COV-2 COVID-19 AMP PRB: CPT

## 2024-04-18 PROCEDURE — 83690 ASSAY OF LIPASE: CPT

## 2024-04-18 PROCEDURE — 84484 ASSAY OF TROPONIN QUANT: CPT

## 2024-04-18 PROCEDURE — 87502 INFLUENZA DNA AMP PROBE: CPT

## 2024-04-18 PROCEDURE — 93005 ELECTROCARDIOGRAM TRACING: CPT

## 2024-04-18 PROCEDURE — 81003 URINALYSIS AUTO W/O SCOPE: CPT | Mod: 59

## 2024-04-18 PROCEDURE — 85025 COMPLETE CBC W/AUTO DIFF WBC: CPT

## 2024-04-18 PROCEDURE — 96374 THER/PROPH/DIAG INJ IV PUSH: CPT

## 2024-04-18 PROCEDURE — 80307 DRUG TEST PRSMV CHEM ANLYZR: CPT

## 2024-04-18 PROCEDURE — 96375 TX/PRO/DX INJ NEW DRUG ADDON: CPT

## 2024-04-18 PROCEDURE — 93010 ELECTROCARDIOGRAM REPORT: CPT | Mod: ,,, | Performed by: INTERNAL MEDICINE

## 2024-04-18 PROCEDURE — 63600175 PHARM REV CODE 636 W HCPCS

## 2024-04-18 PROCEDURE — 99285 EMERGENCY DEPT VISIT HI MDM: CPT | Mod: 25

## 2024-04-18 PROCEDURE — 83880 ASSAY OF NATRIURETIC PEPTIDE: CPT

## 2024-04-18 PROCEDURE — 80053 COMPREHEN METABOLIC PANEL: CPT

## 2024-04-18 RX ORDER — ONDANSETRON 4 MG/1
4 TABLET, FILM COATED ORAL EVERY 6 HOURS
Qty: 12 TABLET | Refills: 0 | Status: SHIPPED | OUTPATIENT
Start: 2024-04-18

## 2024-04-18 RX ORDER — DIPHENHYDRAMINE HYDROCHLORIDE 50 MG/ML
12.5 INJECTION INTRAMUSCULAR; INTRAVENOUS
Status: COMPLETED | OUTPATIENT
Start: 2024-04-18 | End: 2024-04-18

## 2024-04-18 RX ORDER — DROPERIDOL 2.5 MG/ML
1.25 INJECTION, SOLUTION INTRAMUSCULAR; INTRAVENOUS
Status: COMPLETED | OUTPATIENT
Start: 2024-04-18 | End: 2024-04-18

## 2024-04-18 RX ADMIN — DROPERIDOL 1.25 MG: 2.5 INJECTION, SOLUTION INTRAMUSCULAR; INTRAVENOUS at 10:04

## 2024-04-18 RX ADMIN — DIPHENHYDRAMINE HYDROCHLORIDE 12.5 MG: 50 INJECTION, SOLUTION INTRAMUSCULAR; INTRAVENOUS at 10:04

## 2024-04-18 RX ADMIN — SODIUM CHLORIDE, POTASSIUM CHLORIDE, SODIUM LACTATE AND CALCIUM CHLORIDE 1000 ML: 600; 310; 30; 20 INJECTION, SOLUTION INTRAVENOUS at 10:04

## 2024-04-18 NOTE — ED PROVIDER NOTES
Encounter Date: 2024       History     Chief Complaint   Patient presents with    Chest Pain     Pt to ER with reports of CP, SOB, N/V/D since waking up this morning. Pt seen at  and sent here fore further evaluation. Pt reports Psych hx but denies SO,HI,AH,VH.      48-year-old female with a history of nephrolithiasis, hemorrhoids, endometriosis, status post hysterectomy and prior psychiatric hospitalization presents with a chief complaint of chest pain.  The patient says that she woke up with chest pain this morning, vomited twice and also had 1 episode of diarrhea.  She is denying any blood in her vomit or stool.  She says that the chest pain is in the left side of her chest and radiates to her left shoulder.  She was also complaining of shortness of breath, but denies any worsening with activity.  She is denying any fevers, cough, abdominal pain or new rashes.  She denies any alcohol or drug use.    The history is provided by the patient. No  was used.     Review of patient's allergies indicates:   Allergen Reactions    Metaxalone      Other reaction(s): Anaphylaxis    Soma [carisoprodol]      Other reaction(s): Anaphylaxis    Penicillins      Other reaction(s): Unknown     Past Medical History:   Diagnosis Date    Chronic kidney disease     NEPHROLITHIASIS    Endometriosis of pelvis     H/O colonoscopy 1/10/14    INTERNAL HEMORRHOID    History of psychiatric hospitalization     Hx of psychiatric care     Lactose intolerance     Ovarian cancer     Psychiatric problem     Therapy      Past Surgical History:   Procedure Laterality Date    APPENDECTOMY       SECTION      CHOLECYSTECTOMY      ECTOPIC PREGNANCY SURGERY      HYSTERECTOMY      OOPHORECTOMY      OPEN REDUCTION AND INTERNAL FIXATION (ORIF) OF INJURY OF HAND Left 3/17/2023    Procedure: ORIF, HAND;  Surgeon: Daren Machado MD;  Location: [facility];  Service: Orthopedics;  Laterality: Left;  1. Open reduction internal  fixation left 5th metacarpal base fracture  2. Closed reduction percutaneous pinning left 4th metacarpal neck fracture  3. Closed reduction and percutaneous pinning left 3rd metacarpal base fracture       TUBAL LIGATION       Family History   Problem Relation Name Age of Onset    Mental illness Mother      Bipolar disorder Mother      Depression Mother      Heart disease Father          defibrillator    Cancer Maternal Grandmother          ovarian cancer    Breast cancer Maternal Grandmother      Bipolar disorder Paternal Grandmother      Ovarian cancer Paternal Grandmother      Breast cancer Maternal Aunt      Breast cancer Paternal Aunt      Ovarian cancer Paternal Aunt      Ovarian cancer Paternal Aunt       Social History     Tobacco Use    Smoking status: Every Day     Current packs/day: 1.00     Average packs/day: 1 pack/day for 22.0 years (22.0 ttl pk-yrs)     Types: Cigarettes    Smokeless tobacco: Current   Substance Use Topics    Alcohol use: No    Drug use: Yes     Comment: uses all types of drug and prescription medications     Review of Systems    Physical Exam     Initial Vitals [04/18/24 0915]   BP Pulse Resp Temp SpO2   (!) 149/86 83 20 98.5 °F (36.9 °C) 97 %      MAP       --         Physical Exam   The patient was examined specifically for the following:   General:No significant distress, Good color, Warm and dry. Head and neck:Scalp atraumatic, Neck supple. Neurological:Appropriate conversation, Gross motor deficits. Eyes:Conjugate gaze, Clear corneas. ENT: No epistaxis. Cardiac: Regular rate and rhythm, Grossly normal heart tones. Pulmonary: Wheezing, Rales. Gastrointestinal: Abdominal tenderness, Abdominal distention. Musculoskeletal: Extremity deformity, Apparent pain with range of motion of the joints. Skin: Rash.   The findings on examination were normal except for the following:  The patient has exquisite tenderness of the left pectoral chest and a 4 cm area just lateral to the midclavicular  line at about the 4th rib.  Anterior distraction of the left arm across the anterior thorax reproduces the patient's pain and exacerbates it markedly.  ED Course   Procedures  Labs Reviewed   DRUG SCREEN PANEL, URINE EMERGENCY - Abnormal; Notable for the following components:       Result Value    Benzodiazepines Presumptive Positive (*)     THC Presumptive Positive (*)     All other components within normal limits    Narrative:     Specimen Source->Urine   URINALYSIS, REFLEX TO URINE CULTURE - Abnormal; Notable for the following components:    Protein, UA Trace (*)     All other components within normal limits    Narrative:     Specimen Source->Urine   CBC W/ AUTO DIFFERENTIAL - Abnormal; Notable for the following components:    RBC 5.85 (*)     Hemoglobin 17.2 (*)     Hematocrit 52.5 (*)     All other components within normal limits   COMPREHENSIVE METABOLIC PANEL - Abnormal; Notable for the following components:    Total Protein 9.2 (*)     All other components within normal limits   TROPONIN I   B-TYPE NATRIURETIC PEPTIDE   LIPASE   PROTIME-INR   SARS-COV-2 RDRP GENE   POCT INFLUENZA A/B MOLECULAR     EKG Readings: (Independently Interpreted)   This is doctor Wynn dictating.  I independently interpreted this EKG.  This patient is in a normal sinus rhythm with a heart rate of 80.  There is poor R-wave progression across the precordium.  There is left axis deviation there are no significant ST segment or T-wave changes.  I doubt myocardial infarction or malignant arrhythmia.     ECG Results              EKG 12-lead (Final result)        Collection Time Result Time QRS Duration OHS QTC Calculation    04/18/24 09:15:52 04/18/24 13:00:52 82 445                     Final result by Interface, Lab In [facility] (04/18/24 13:01:00)                   Narrative:    Test Reason : R07.9,    Vent. Rate : 080 BPM     Atrial Rate : 080 BPM     P-R Int : 138 ms          QRS Dur : 082 ms      QT Int : 386 ms       P-R-T Axes :  051 -60 057 degrees     QTc Int : 445 ms    Normal sinus rhythm  Left axis deviation  Abnormal ECG  When compared with ECG of 16-JAN-2024 11:53,  Significant changes have occurred  Confirmed by Albino Chawla MD ([id number]) on 4/18/2024 1:00:46 PM    Referred By: YULIA   SELF           Confirmed By:Albino Chawla MD                                  Imaging Results              X-Ray Chest 1 View (Final result)  Result time 04/18/24 10:08:09      Final result by Leroy Burns MD (04/18/24 10:08:09)                   Impression:      As above.      Electronically signed by: Leroy Burns  Date:    04/18/2024  Time:    10:08               Narrative:    EXAMINATION:  XR CHEST 1 VIEW    CLINICAL HISTORY:  Shortness of breath    TECHNIQUE:  Single frontal view of the chest was performed.    COMPARISON:  Chest radiograph 01/16/2024; CT abdomen pelvis 01/16/2024    FINDINGS:  Lines and tubes: None.    Heart and mediastinum: Unremarkable.    Pleura: No pleural effusion or pneumothorax.  Small region of pleural thickening in the left lower lung zone adjacent to the rib fractures.    Lungs: Lungs are well inflated. No focal consolidations or evidence of pulmonary edema.    Soft tissue/bone: Remote fracture of the left 8th rib.  Subacute fracture of the left anterolateral 7th rib.                                       Medications   droPERidol injection 1.25 mg (1.25 mg Intravenous Given 4/18/24 1013)   diphenhydrAMINE injection 12.5 mg (12.5 mg Intravenous Given 4/18/24 1011)   lactated ringers bolus 1,000 mL (0 mLs Intravenous Stopped 4/18/24 1213)     Medical Decision Making  Amount and/or Complexity of Data Reviewed  Labs: ordered. Decision-making details documented in ED Course.  Radiology: ordered.    Risk  Prescription drug management.               ED Course as of 04/18/24 1556   Thu Apr 18, 2024   0923 Sinus rhythm at 80 beats per minute, all intervals within normal limits, no STEMI on my independent review [BP]    0939 48-year-old female in no acute distress.  I treated her with droperidol and Benadryl.  Initial EKG not concerning for ischemia.  Differential includes but is not limited to URI versus pneumonia versus withdrawal versus panic attack, doubt ACS, patient has reproducible point tenderness, reassuring EKG, but considered, also considered PE, but patient is PERC negative, she was not tachycardic, dyspneic or hypoxic. [BP]   1102 Benzodiazepines(!): Presumptive Positive  Patient takes tizanidine [BP]   1102 Marijuana (THC) Metabolite(!): Presumptive Positive [BP]   1102 WBC: 8.32  No leukocytosis [BP]   1102 Comprehensive metabolic panel(!)  No significant electrolyte or metabolic derangement, renal function at baseline [BP]   1103 Urinalysis, Reflex to Urine Culture Urine, Clean Catch(!)  Not concerning [BP]   1158 On reassessment, patient says she feels much better.  I discussed the results vertigo workup with her and said that I did not think that there was an emergent cause for her chest pain.  I discussed return precautions with her and advised that she can continue taking her muscle relaxer and Tylenol for pain.  Prescribed a short course of Zofran.  I answered all questions, provided discharge paperwork and the patient was discharged in stable condition. [BP]      ED Course User Index  [BP] Audie Nicholson MD            Resident supervising staff physician attestation note:  This is doctor Wynn dictating.  I examined this patient at 9:50 a.m..  The patient has exquisite tenderness of the left pectoral chest and a 4 cm area just lateral to the midclavicular line at about the 4th rib.  Anterior distraction of the left arm across the anterior thorax reproduces the patient's pain and exacerbates it markedly.  I feel this is likely chest wall pain syndrome.  The patient is perc negative.  I carefully considered pulmonary embolus and feel it is unlikely here.  We will do a chest x-ray to exclude pneumothorax  pneumonia pleural effusion.  The patient has vomiting and diarrhea.  I feel this is likely viral.  Nontender.  It we will treat the patient symptomatically and rehydrate.  I agree with the resident documentation, diagnostic and treatment plan.  This is doctor Kingsley dictating.                 Clinical Impression:  Final diagnoses:  [R07.9] Chest pain  [R06.02] Shortness of breath  [R11.2] Nausea and vomiting, unspecified vomiting type (Primary)  [R19.7] Diarrhea, unspecified type          ED Disposition Condition    Discharge Stable          ED Prescriptions       Medication Sig Dispense Start Date End Date Auth. Provider    ondansetron (ZOFRAN) 4 MG tablet Take 1 tablet (4 mg total) by mouth every 6 (six) hours. 12 tablet 4/18/2024 -- Audie Nicholson MD          Follow-up Information       Follow up With Specialties Details Why Contact Andrés Khan MD Family Medicine  As needed, If symptoms worsen [address]  [facility]  [place] [address]  [phone]               Audie Nicholson MD  Resident  04/18/24 1555       Kayden Wynn MD  05/24/24 3928

## 2024-04-18 NOTE — ED TRIAGE NOTES
"Pt reporting 8/10 midsternal chest pain described as "throbbing", and n/v/d that started this morning. Pt denies taking any medications PTA. Pmhx of CKD, endometriosis of the pelvis, ovarian cancer, and psychiatric care.  "